# Patient Record
Sex: FEMALE | Race: WHITE
[De-identification: names, ages, dates, MRNs, and addresses within clinical notes are randomized per-mention and may not be internally consistent; named-entity substitution may affect disease eponyms.]

---

## 2017-05-09 ENCOUNTER — APPOINTMENT (OUTPATIENT)
Dept: COLORECTAL SURGERY | Facility: CLINIC | Age: 72
End: 2017-05-09

## 2017-05-09 VITALS
SYSTOLIC BLOOD PRESSURE: 134 MMHG | BODY MASS INDEX: 23.03 KG/M2 | TEMPERATURE: 98.1 F | HEIGHT: 61 IN | DIASTOLIC BLOOD PRESSURE: 74 MMHG | WEIGHT: 122 LBS

## 2017-05-09 DIAGNOSIS — J44.9 CHRONIC OBSTRUCTIVE PULMONARY DISEASE, UNSPECIFIED: ICD-10-CM

## 2017-05-09 DIAGNOSIS — K57.90 DIVERTICULOSIS OF INTESTINE, PART UNSPECIFIED, W/OUT PERFORATION OR ABSCESS W/OUT BLEEDING: ICD-10-CM

## 2017-05-09 DIAGNOSIS — Z78.9 OTHER SPECIFIED HEALTH STATUS: ICD-10-CM

## 2017-05-09 DIAGNOSIS — Z87.891 PERSONAL HISTORY OF NICOTINE DEPENDENCE: ICD-10-CM

## 2017-05-17 RX ORDER — ALVIMOPAN 12 MG/1
12 CAPSULE ORAL ONCE
Qty: 0 | Refills: 0 | Status: COMPLETED | OUTPATIENT
Start: 2017-05-22 | End: 2017-05-22

## 2017-05-17 RX ORDER — SODIUM CHLORIDE 9 MG/ML
3 INJECTION INTRAMUSCULAR; INTRAVENOUS; SUBCUTANEOUS EVERY 8 HOURS
Qty: 0 | Refills: 0 | Status: DISCONTINUED | OUTPATIENT
Start: 2017-05-22 | End: 2017-05-26

## 2017-05-19 ENCOUNTER — OUTPATIENT (OUTPATIENT)
Dept: OUTPATIENT SERVICES | Facility: HOSPITAL | Age: 72
LOS: 1 days | Discharge: HOME | End: 2017-05-19
Payer: COMMERCIAL

## 2017-05-19 VITALS
SYSTOLIC BLOOD PRESSURE: 147 MMHG | DIASTOLIC BLOOD PRESSURE: 62 MMHG | OXYGEN SATURATION: 100 % | TEMPERATURE: 98 F | HEIGHT: 61 IN | HEART RATE: 70 BPM | RESPIRATION RATE: 14 BRPM | WEIGHT: 123.9 LBS

## 2017-05-19 DIAGNOSIS — Z98.890 OTHER SPECIFIED POSTPROCEDURAL STATES: Chronic | ICD-10-CM

## 2017-05-19 DIAGNOSIS — D49.0 NEOPLASM OF UNSPECIFIED BEHAVIOR OF DIGESTIVE SYSTEM: ICD-10-CM

## 2017-05-19 DIAGNOSIS — K56.69 OTHER INTESTINAL OBSTRUCTION: ICD-10-CM

## 2017-05-19 DIAGNOSIS — K63.9 DISEASE OF INTESTINE, UNSPECIFIED: ICD-10-CM

## 2017-05-19 DIAGNOSIS — Z01.818 ENCOUNTER FOR OTHER PREPROCEDURAL EXAMINATION: ICD-10-CM

## 2017-05-19 DIAGNOSIS — Z90.89 ACQUIRED ABSENCE OF OTHER ORGANS: Chronic | ICD-10-CM

## 2017-05-19 DIAGNOSIS — I10 ESSENTIAL (PRIMARY) HYPERTENSION: ICD-10-CM

## 2017-05-19 DIAGNOSIS — J44.1 CHRONIC OBSTRUCTIVE PULMONARY DISEASE WITH (ACUTE) EXACERBATION: ICD-10-CM

## 2017-05-19 DIAGNOSIS — E87.1 HYPO-OSMOLALITY AND HYPONATREMIA: ICD-10-CM

## 2017-05-19 DIAGNOSIS — K21.9 GASTRO-ESOPHAGEAL REFLUX DISEASE WITHOUT ESOPHAGITIS: ICD-10-CM

## 2017-05-19 DIAGNOSIS — K57.32 DIVERTICULITIS OF LARGE INTESTINE WITHOUT PERFORATION OR ABSCESS WITHOUT BLEEDING: ICD-10-CM

## 2017-05-19 DIAGNOSIS — E87.8 OTHER DISORDERS OF ELECTROLYTE AND FLUID BALANCE, NOT ELSEWHERE CLASSIFIED: ICD-10-CM

## 2017-05-19 LAB
ABO RH CONFIRMATION: SIGNIFICANT CHANGE UP
ALBUMIN SERPL ELPH-MCNC: 3.8 G/DL — SIGNIFICANT CHANGE UP (ref 3.3–5)
ALP SERPL-CCNC: 59 U/L — SIGNIFICANT CHANGE UP (ref 40–120)
ALT FLD-CCNC: 23 U/L — SIGNIFICANT CHANGE UP (ref 12–78)
ANION GAP SERPL CALC-SCNC: 9 MMOL/L — SIGNIFICANT CHANGE UP (ref 5–17)
APTT BLD: 23.4 SEC — LOW (ref 27.5–37.4)
AST SERPL-CCNC: 19 U/L — SIGNIFICANT CHANGE UP (ref 15–37)
BASOPHILS # BLD AUTO: 0.1 K/UL — SIGNIFICANT CHANGE UP (ref 0–0.2)
BASOPHILS NFR BLD AUTO: 0.6 % — SIGNIFICANT CHANGE UP (ref 0–2)
BILIRUB SERPL-MCNC: 0.5 MG/DL — SIGNIFICANT CHANGE UP (ref 0.2–1.2)
BLD GP AB SCN SERPL QL: SIGNIFICANT CHANGE UP
BUN SERPL-MCNC: 25 MG/DL — HIGH (ref 7–23)
CALCIUM SERPL-MCNC: 9.6 MG/DL — SIGNIFICANT CHANGE UP (ref 8.5–10.1)
CEA SERPL-MCNC: 2.1 NG/ML — SIGNIFICANT CHANGE UP (ref 0–3.8)
CHLORIDE SERPL-SCNC: 100 MMOL/L — SIGNIFICANT CHANGE UP (ref 96–108)
CO2 SERPL-SCNC: 30 MMOL/L — SIGNIFICANT CHANGE UP (ref 22–31)
CREAT SERPL-MCNC: 0.92 MG/DL — SIGNIFICANT CHANGE UP (ref 0.5–1.3)
EOSINOPHIL # BLD AUTO: 0.1 K/UL — SIGNIFICANT CHANGE UP (ref 0–0.5)
EOSINOPHIL NFR BLD AUTO: 0.9 % — SIGNIFICANT CHANGE UP (ref 0–6)
GLUCOSE SERPL-MCNC: 93 MG/DL — SIGNIFICANT CHANGE UP (ref 70–99)
HBA1C BLD-MCNC: 5.9 % — HIGH (ref 4–5.6)
HCT VFR BLD CALC: 38.1 % — SIGNIFICANT CHANGE UP (ref 34.5–45)
HGB BLD-MCNC: 13.2 G/DL — SIGNIFICANT CHANGE UP (ref 11.5–15.5)
INR BLD: 0.95 RATIO — SIGNIFICANT CHANGE UP (ref 0.88–1.16)
LYMPHOCYTES # BLD AUTO: 2.3 K/UL — SIGNIFICANT CHANGE UP (ref 1–3.3)
LYMPHOCYTES # BLD AUTO: 22.7 % — SIGNIFICANT CHANGE UP (ref 13–44)
MCHC RBC-ENTMCNC: 32.1 PG — SIGNIFICANT CHANGE UP (ref 27–34)
MCHC RBC-ENTMCNC: 34.6 GM/DL — SIGNIFICANT CHANGE UP (ref 32–36)
MCV RBC AUTO: 92.8 FL — SIGNIFICANT CHANGE UP (ref 80–100)
MONOCYTES # BLD AUTO: 0.7 K/UL — SIGNIFICANT CHANGE UP (ref 0–0.9)
MONOCYTES NFR BLD AUTO: 7.1 % — SIGNIFICANT CHANGE UP (ref 2–14)
NEUTROPHILS # BLD AUTO: 6.9 K/UL — SIGNIFICANT CHANGE UP (ref 1.8–7.4)
NEUTROPHILS NFR BLD AUTO: 68.7 % — SIGNIFICANT CHANGE UP (ref 43–77)
PLATELET # BLD AUTO: 276 K/UL — SIGNIFICANT CHANGE UP (ref 150–400)
POTASSIUM SERPL-MCNC: 4.5 MMOL/L — SIGNIFICANT CHANGE UP (ref 3.5–5.3)
POTASSIUM SERPL-SCNC: 4.5 MMOL/L — SIGNIFICANT CHANGE UP (ref 3.5–5.3)
PROT SERPL-MCNC: 6.6 GM/DL — SIGNIFICANT CHANGE UP (ref 6–8.3)
PROTHROM AB SERPL-ACNC: 10.3 SEC — SIGNIFICANT CHANGE UP (ref 9.8–12.7)
RBC # BLD: 4.1 M/UL — SIGNIFICANT CHANGE UP (ref 3.8–5.2)
RBC # FLD: 11 % — SIGNIFICANT CHANGE UP (ref 10.3–14.5)
SODIUM SERPL-SCNC: 139 MMOL/L — SIGNIFICANT CHANGE UP (ref 135–145)
TYPE + AB SCN PNL BLD: SIGNIFICANT CHANGE UP
WBC # BLD: 10.1 K/UL — SIGNIFICANT CHANGE UP (ref 3.8–10.5)
WBC # FLD AUTO: 10.1 K/UL — SIGNIFICANT CHANGE UP (ref 3.8–10.5)

## 2017-05-19 PROCEDURE — 93010 ELECTROCARDIOGRAM REPORT: CPT

## 2017-05-19 RX ORDER — HYDROMORPHONE HYDROCHLORIDE 2 MG/ML
30 INJECTION INTRAMUSCULAR; INTRAVENOUS; SUBCUTANEOUS
Qty: 0 | Refills: 0 | Status: DISCONTINUED | OUTPATIENT
Start: 2017-05-22 | End: 2017-05-24

## 2017-05-19 RX ORDER — HYDROMORPHONE HYDROCHLORIDE 2 MG/ML
0.5 INJECTION INTRAMUSCULAR; INTRAVENOUS; SUBCUTANEOUS
Qty: 0 | Refills: 0 | Status: DISCONTINUED | OUTPATIENT
Start: 2017-05-22 | End: 2017-05-24

## 2017-05-19 RX ORDER — SODIUM CHLORIDE 9 MG/ML
3 INJECTION INTRAMUSCULAR; INTRAVENOUS; SUBCUTANEOUS EVERY 8 HOURS
Qty: 0 | Refills: 0 | Status: DISCONTINUED | OUTPATIENT
Start: 2017-05-22 | End: 2017-05-26

## 2017-05-19 RX ORDER — NALOXONE HYDROCHLORIDE 4 MG/.1ML
0.1 SPRAY NASAL
Qty: 0 | Refills: 0 | Status: DISCONTINUED | OUTPATIENT
Start: 2017-05-22 | End: 2017-05-26

## 2017-05-19 RX ORDER — SODIUM CHLORIDE 9 MG/ML
1000 INJECTION, SOLUTION INTRAVENOUS
Qty: 0 | Refills: 0 | Status: DISCONTINUED | OUTPATIENT
Start: 2017-05-22 | End: 2017-05-22

## 2017-05-19 RX ORDER — OXYCODONE HYDROCHLORIDE 5 MG/1
10 TABLET ORAL ONCE
Qty: 0 | Refills: 0 | Status: DISCONTINUED | OUTPATIENT
Start: 2017-05-22 | End: 2017-05-22

## 2017-05-19 RX ORDER — ACETAMINOPHEN 500 MG
975 TABLET ORAL ONCE
Qty: 0 | Refills: 0 | Status: COMPLETED | OUTPATIENT
Start: 2017-05-22 | End: 2017-05-22

## 2017-05-19 RX ORDER — ONDANSETRON 8 MG/1
4 TABLET, FILM COATED ORAL EVERY 6 HOURS
Qty: 0 | Refills: 0 | Status: DISCONTINUED | OUTPATIENT
Start: 2017-05-22 | End: 2017-05-26

## 2017-05-19 NOTE — H&P PST ADULT - PMH
COPD (chronic obstructive pulmonary disease)    Deviated septum    Diverticulosis    Excessive gas    First degree hemorrhoids    Former smoker  Quit 2010  Hiatal hernia with GERD    History of DVT (deep vein thrombosis)  RLE: 2015  Hypercholesterolemia    Hypertension    Internal hemorrhoids    Osteopenia

## 2017-05-19 NOTE — H&P PST ADULT - FAMILY HISTORY
Father  Still living? No  Family history of lung cancer, Age at diagnosis: 71-80     Mother  Still living? No  Family history of cerebrovascular accident (CVA) in mother, Age at diagnosis: 71-80

## 2017-05-19 NOTE — H&P PST ADULT - VISION (WITH CORRECTIVE LENSES IF THE PATIENT USUALLY WEARS THEM):
Reading glasses PRN./Partially impaired: cannot see medication labels or newsprint, but can see obstacles in path, and the surrounding layout; can count fingers at arm's length

## 2017-05-19 NOTE — H&P PST ADULT - ASSESSMENT
This is a  71y /o  Female who presents to [facility] prior to proposed procedure with Dr. Montano for laparoscopic possible sigmoid colon resection mobilization of splenic flexure, rigid sigmoid proctosigmoidoscopy, lysis of adhesion.     1. labs: per Dr. Ponce.  2. EKG to be reviewed by Cardiology.  3. CXR on chart from 5/2017.  4. Clearance with Dr. Leo completed per patient.  5. EZ sponges, holistic sheet, pamphlet holistic sheet, and day of procedure instructions provided and reviewed with patient.

## 2017-05-19 NOTE — H&P PST ADULT - PSH
S/P colonoscopy  unsure of all dates; last one 2017  S/P LASIK surgery of both eyes  2002  S/P tonsillectomy and adenoidectomy  Childhood

## 2017-05-20 ENCOUNTER — MED ADMIN CHARGE (OUTPATIENT)
Age: 72
End: 2017-05-20

## 2017-05-22 ENCOUNTER — APPOINTMENT (OUTPATIENT)
Dept: COLORECTAL SURGERY | Facility: HOSPITAL | Age: 72
End: 2017-05-22

## 2017-05-22 ENCOUNTER — RESULT REVIEW (OUTPATIENT)
Age: 72
End: 2017-05-22

## 2017-05-22 ENCOUNTER — INPATIENT (INPATIENT)
Facility: HOSPITAL | Age: 72
LOS: 3 days | Discharge: HOME | End: 2017-05-26
Attending: COLON & RECTAL SURGERY | Admitting: COLON & RECTAL SURGERY
Payer: COMMERCIAL

## 2017-05-22 VITALS
OXYGEN SATURATION: 100 % | SYSTOLIC BLOOD PRESSURE: 141 MMHG | DIASTOLIC BLOOD PRESSURE: 73 MMHG | RESPIRATION RATE: 16 BRPM | HEART RATE: 73 BPM | HEIGHT: 61 IN | TEMPERATURE: 97 F | WEIGHT: 121.92 LBS

## 2017-05-22 DIAGNOSIS — Z90.89 ACQUIRED ABSENCE OF OTHER ORGANS: Chronic | ICD-10-CM

## 2017-05-22 DIAGNOSIS — Z98.890 OTHER SPECIFIED POSTPROCEDURAL STATES: Chronic | ICD-10-CM

## 2017-05-22 PROCEDURE — 88305 TISSUE EXAM BY PATHOLOGIST: CPT | Mod: 26

## 2017-05-22 PROCEDURE — 45300 PROCTOSIGMOIDOSCOPY DX: CPT

## 2017-05-22 PROCEDURE — 58740 ADHESIOLYSIS TUBE OVARY: CPT

## 2017-05-22 PROCEDURE — 38570 LAPAROSCOPY LYMPH NODE BIOP: CPT | Mod: AS

## 2017-05-22 PROCEDURE — 44213 LAP MOBIL SPLENIC FL ADD-ON: CPT

## 2017-05-22 PROCEDURE — 44207 L COLECTOMY/COLOPROCTOSTOMY: CPT | Mod: AS

## 2017-05-22 PROCEDURE — 44207 L COLECTOMY/COLOPROCTOSTOMY: CPT

## 2017-05-22 PROCEDURE — 58740 ADHESIOLYSIS TUBE OVARY: CPT | Mod: AS

## 2017-05-22 PROCEDURE — 88342 IMHCHEM/IMCYTCHM 1ST ANTB: CPT | Mod: 26

## 2017-05-22 PROCEDURE — 38570 LAPAROSCOPY LYMPH NODE BIOP: CPT

## 2017-05-22 PROCEDURE — 88309 TISSUE EXAM BY PATHOLOGIST: CPT | Mod: 26

## 2017-05-22 PROCEDURE — 88341 IMHCHEM/IMCYTCHM EA ADD ANTB: CPT | Mod: 26

## 2017-05-22 PROCEDURE — 44213 LAP MOBIL SPLENIC FL ADD-ON: CPT | Mod: AS

## 2017-05-22 RX ORDER — SODIUM CHLORIDE 9 MG/ML
1000 INJECTION, SOLUTION INTRAVENOUS
Qty: 0 | Refills: 0 | Status: DISCONTINUED | OUTPATIENT
Start: 2017-05-22 | End: 2017-05-23

## 2017-05-22 RX ORDER — CELECOXIB 200 MG/1
200 CAPSULE ORAL ONCE
Qty: 0 | Refills: 0 | Status: DISCONTINUED | OUTPATIENT
Start: 2017-05-22 | End: 2017-05-22

## 2017-05-22 RX ORDER — ONDANSETRON 8 MG/1
4 TABLET, FILM COATED ORAL EVERY 6 HOURS
Qty: 0 | Refills: 0 | Status: DISCONTINUED | OUTPATIENT
Start: 2017-05-22 | End: 2017-05-26

## 2017-05-22 RX ORDER — SIMVASTATIN 20 MG/1
10 TABLET, FILM COATED ORAL AT BEDTIME
Qty: 0 | Refills: 0 | Status: DISCONTINUED | OUTPATIENT
Start: 2017-05-22 | End: 2017-05-26

## 2017-05-22 RX ORDER — MONTELUKAST 4 MG/1
10 TABLET, CHEWABLE ORAL DAILY
Qty: 0 | Refills: 0 | Status: DISCONTINUED | OUTPATIENT
Start: 2017-05-22 | End: 2017-05-26

## 2017-05-22 RX ORDER — SIMVASTATIN 20 MG/1
1 TABLET, FILM COATED ORAL
Qty: 0 | Refills: 0 | COMMUNITY

## 2017-05-22 RX ORDER — ACETAMINOPHEN 500 MG
1000 TABLET ORAL ONCE
Qty: 0 | Refills: 0 | Status: COMPLETED | OUTPATIENT
Start: 2017-05-22 | End: 2017-05-22

## 2017-05-22 RX ORDER — CEFOTETAN DISODIUM 1 G
2 VIAL (EA) INJECTION ONCE
Qty: 0 | Refills: 0 | Status: COMPLETED | OUTPATIENT
Start: 2017-05-23 | End: 2017-05-23

## 2017-05-22 RX ORDER — ASPIRIN/CALCIUM CARB/MAGNESIUM 324 MG
1 TABLET ORAL
Qty: 0 | Refills: 0 | COMMUNITY

## 2017-05-22 RX ORDER — CEFOTETAN DISODIUM 1 G
2 VIAL (EA) INJECTION ONCE
Qty: 0 | Refills: 0 | Status: COMPLETED | OUTPATIENT
Start: 2017-05-22 | End: 2017-05-22

## 2017-05-22 RX ORDER — ALVIMOPAN 12 MG/1
12 CAPSULE ORAL
Qty: 0 | Refills: 0 | Status: DISCONTINUED | OUTPATIENT
Start: 2017-05-22 | End: 2017-05-26

## 2017-05-22 RX ORDER — VALSARTAN 80 MG/1
320 TABLET ORAL DAILY
Qty: 0 | Refills: 0 | Status: DISCONTINUED | OUTPATIENT
Start: 2017-05-22 | End: 2017-05-26

## 2017-05-22 RX ORDER — HEPARIN SODIUM 5000 [USP'U]/ML
5000 INJECTION INTRAVENOUS; SUBCUTANEOUS ONCE
Qty: 0 | Refills: 0 | Status: COMPLETED | OUTPATIENT
Start: 2017-05-22 | End: 2017-05-22

## 2017-05-22 RX ORDER — HEPARIN SODIUM 5000 [USP'U]/ML
5000 INJECTION INTRAVENOUS; SUBCUTANEOUS EVERY 8 HOURS
Qty: 0 | Refills: 0 | Status: DISCONTINUED | OUTPATIENT
Start: 2017-05-23 | End: 2017-05-26

## 2017-05-22 RX ORDER — MONTELUKAST 4 MG/1
1 TABLET, CHEWABLE ORAL
Qty: 0 | Refills: 0 | COMMUNITY

## 2017-05-22 RX ORDER — KETOROLAC TROMETHAMINE 30 MG/ML
15 SYRINGE (ML) INJECTION EVERY 6 HOURS
Qty: 0 | Refills: 0 | Status: DISCONTINUED | OUTPATIENT
Start: 2017-05-22 | End: 2017-05-24

## 2017-05-22 RX ADMIN — OXYCODONE HYDROCHLORIDE 10 MILLIGRAM(S): 5 TABLET ORAL at 11:01

## 2017-05-22 RX ADMIN — SODIUM CHLORIDE 150 MILLILITER(S): 9 INJECTION, SOLUTION INTRAVENOUS at 17:10

## 2017-05-22 RX ADMIN — ALVIMOPAN 12 MILLIGRAM(S): 12 CAPSULE ORAL at 11:00

## 2017-05-22 RX ADMIN — Medication 1000 MILLIGRAM(S): at 23:22

## 2017-05-22 RX ADMIN — HYDROMORPHONE HYDROCHLORIDE 30 MILLILITER(S): 2 INJECTION INTRAMUSCULAR; INTRAVENOUS; SUBCUTANEOUS at 16:18

## 2017-05-22 RX ADMIN — HEPARIN SODIUM 5000 UNIT(S): 5000 INJECTION INTRAVENOUS; SUBCUTANEOUS at 11:01

## 2017-05-22 RX ADMIN — SODIUM CHLORIDE 75 MILLILITER(S): 9 INJECTION, SOLUTION INTRAVENOUS at 16:10

## 2017-05-22 RX ADMIN — Medication 975 MILLIGRAM(S): at 11:01

## 2017-05-22 RX ADMIN — Medication 400 MILLIGRAM(S): at 23:10

## 2017-05-22 NOTE — PATIENT PROFILE ADULT. - VISION (WITH CORRECTIVE LENSES IF THE PATIENT USUALLY WEARS THEM):
Partially impaired: cannot see medication labels or newsprint, but can see obstacles in path, and the surrounding layout; can count fingers at arm's length/Reading glasses PRN.

## 2017-05-22 NOTE — BRIEF OPERATIVE NOTE - PROCEDURE
Rigid proctosigmoidoscopy  05/22/2017    Active  MBERRONESJR  Mobilization of splenic flexure performed in conjunction with partial colectomy  05/22/2017    Active  MBERRONESJXAVIER  Colon resection  05/22/2017  laparascopic assisted  Active  MBERRONESJR  Robotic assistance in laparoscopic procedure  05/22/2017    Active  MBERRONESJR

## 2017-05-22 NOTE — BRIEF OPERATIVE NOTE - POST-OP DX
Colon stricture  05/22/2017  sigmoid  Active  Jose Baldwin  Colon tumor  05/22/2017  sigmoid  Active  Jose Baldwin

## 2017-05-23 DIAGNOSIS — K63.9 DISEASE OF INTESTINE, UNSPECIFIED: ICD-10-CM

## 2017-05-23 DIAGNOSIS — I10 ESSENTIAL (PRIMARY) HYPERTENSION: ICD-10-CM

## 2017-05-23 DIAGNOSIS — J44.9 CHRONIC OBSTRUCTIVE PULMONARY DISEASE, UNSPECIFIED: ICD-10-CM

## 2017-05-23 DIAGNOSIS — E78.5 HYPERLIPIDEMIA, UNSPECIFIED: ICD-10-CM

## 2017-05-23 DIAGNOSIS — Z86.718 PERSONAL HISTORY OF OTHER VENOUS THROMBOSIS AND EMBOLISM: ICD-10-CM

## 2017-05-23 LAB
ANION GAP SERPL CALC-SCNC: 10 MMOL/L — SIGNIFICANT CHANGE UP (ref 5–17)
BASOPHILS # BLD AUTO: 0 K/UL — SIGNIFICANT CHANGE UP (ref 0–0.2)
BASOPHILS NFR BLD AUTO: 0.3 % — SIGNIFICANT CHANGE UP (ref 0–2)
BUN SERPL-MCNC: 15 MG/DL — SIGNIFICANT CHANGE UP (ref 7–23)
CALCIUM SERPL-MCNC: 8.3 MG/DL — LOW (ref 8.5–10.1)
CHLORIDE SERPL-SCNC: 94 MMOL/L — LOW (ref 96–108)
CO2 SERPL-SCNC: 26 MMOL/L — SIGNIFICANT CHANGE UP (ref 22–31)
CREAT SERPL-MCNC: 1.19 MG/DL — SIGNIFICANT CHANGE UP (ref 0.5–1.3)
EOSINOPHIL # BLD AUTO: 0 K/UL — SIGNIFICANT CHANGE UP (ref 0–0.5)
EOSINOPHIL NFR BLD AUTO: 0 % — SIGNIFICANT CHANGE UP (ref 0–6)
GLUCOSE SERPL-MCNC: 114 MG/DL — HIGH (ref 70–99)
HCT VFR BLD CALC: 34 % — LOW (ref 34.5–45)
HGB BLD-MCNC: 11.4 G/DL — LOW (ref 11.5–15.5)
LYMPHOCYTES # BLD AUTO: 0.5 K/UL — LOW (ref 1–3.3)
LYMPHOCYTES # BLD AUTO: 3.9 % — LOW (ref 13–44)
MAGNESIUM SERPL-MCNC: 1.8 MG/DL — SIGNIFICANT CHANGE UP (ref 1.6–2.6)
MCHC RBC-ENTMCNC: 32.1 PG — SIGNIFICANT CHANGE UP (ref 27–34)
MCHC RBC-ENTMCNC: 33.6 GM/DL — SIGNIFICANT CHANGE UP (ref 32–36)
MCV RBC AUTO: 95.5 FL — SIGNIFICANT CHANGE UP (ref 80–100)
MONOCYTES # BLD AUTO: 0.8 K/UL — SIGNIFICANT CHANGE UP (ref 0–0.9)
MONOCYTES NFR BLD AUTO: 5.7 % — SIGNIFICANT CHANGE UP (ref 2–14)
NEUTROPHILS # BLD AUTO: 11.9 K/UL — HIGH (ref 1.8–7.4)
NEUTROPHILS NFR BLD AUTO: 90 % — HIGH (ref 43–77)
PHOSPHATE SERPL-MCNC: 3.2 MG/DL — SIGNIFICANT CHANGE UP (ref 2.5–4.5)
PLATELET # BLD AUTO: 262 K/UL — SIGNIFICANT CHANGE UP (ref 150–400)
POTASSIUM SERPL-MCNC: 4.9 MMOL/L — SIGNIFICANT CHANGE UP (ref 3.5–5.3)
POTASSIUM SERPL-SCNC: 4.9 MMOL/L — SIGNIFICANT CHANGE UP (ref 3.5–5.3)
RBC # BLD: 3.56 M/UL — LOW (ref 3.8–5.2)
RBC # FLD: 10.9 % — SIGNIFICANT CHANGE UP (ref 10.3–14.5)
SODIUM SERPL-SCNC: 130 MMOL/L — LOW (ref 135–145)
WBC # BLD: 13.2 K/UL — HIGH (ref 3.8–10.5)
WBC # FLD AUTO: 13.2 K/UL — HIGH (ref 3.8–10.5)

## 2017-05-23 PROCEDURE — 71010: CPT | Mod: 26

## 2017-05-23 RX ORDER — CEFUROXIME AXETIL 250 MG
250 TABLET ORAL EVERY 12 HOURS
Qty: 0 | Refills: 0 | Status: DISCONTINUED | OUTPATIENT
Start: 2017-05-23 | End: 2017-05-26

## 2017-05-23 RX ORDER — ALBUTEROL 90 UG/1
2.5 AEROSOL, METERED ORAL EVERY 4 HOURS
Qty: 0 | Refills: 0 | Status: DISCONTINUED | OUTPATIENT
Start: 2017-05-23 | End: 2017-05-26

## 2017-05-23 RX ORDER — BUDESONIDE AND FORMOTEROL FUMARATE DIHYDRATE 160; 4.5 UG/1; UG/1
1 AEROSOL RESPIRATORY (INHALATION)
Qty: 0 | Refills: 0 | Status: DISCONTINUED | OUTPATIENT
Start: 2017-05-23 | End: 2017-05-26

## 2017-05-23 RX ORDER — SODIUM CHLORIDE 9 MG/ML
1000 INJECTION INTRAMUSCULAR; INTRAVENOUS; SUBCUTANEOUS
Qty: 0 | Refills: 0 | Status: DISCONTINUED | OUTPATIENT
Start: 2017-05-23 | End: 2017-05-23

## 2017-05-23 RX ORDER — SODIUM CHLORIDE 9 MG/ML
1000 INJECTION INTRAMUSCULAR; INTRAVENOUS; SUBCUTANEOUS
Qty: 0 | Refills: 0 | Status: DISCONTINUED | OUTPATIENT
Start: 2017-05-23 | End: 2017-05-24

## 2017-05-23 RX ORDER — IPRATROPIUM/ALBUTEROL SULFATE 18-103MCG
3 AEROSOL WITH ADAPTER (GRAM) INHALATION EVERY 6 HOURS
Qty: 0 | Refills: 0 | Status: DISCONTINUED | OUTPATIENT
Start: 2017-05-23 | End: 2017-05-25

## 2017-05-23 RX ADMIN — SODIUM CHLORIDE 3 MILLILITER(S): 9 INJECTION INTRAMUSCULAR; INTRAVENOUS; SUBCUTANEOUS at 14:03

## 2017-05-23 RX ADMIN — Medication 3 MILLILITER(S): at 22:35

## 2017-05-23 RX ADMIN — SODIUM CHLORIDE 50 MILLILITER(S): 9 INJECTION INTRAMUSCULAR; INTRAVENOUS; SUBCUTANEOUS at 21:59

## 2017-05-23 RX ADMIN — Medication 100 GRAM(S): at 03:47

## 2017-05-23 RX ADMIN — SODIUM CHLORIDE 3 MILLILITER(S): 9 INJECTION INTRAMUSCULAR; INTRAVENOUS; SUBCUTANEOUS at 21:45

## 2017-05-23 RX ADMIN — ALVIMOPAN 12 MILLIGRAM(S): 12 CAPSULE ORAL at 18:56

## 2017-05-23 RX ADMIN — SODIUM CHLORIDE 100 MILLILITER(S): 9 INJECTION INTRAMUSCULAR; INTRAVENOUS; SUBCUTANEOUS at 10:23

## 2017-05-23 RX ADMIN — SIMVASTATIN 10 MILLIGRAM(S): 20 TABLET, FILM COATED ORAL at 21:39

## 2017-05-23 RX ADMIN — HEPARIN SODIUM 5000 UNIT(S): 5000 INJECTION INTRAVENOUS; SUBCUTANEOUS at 05:44

## 2017-05-23 RX ADMIN — HEPARIN SODIUM 5000 UNIT(S): 5000 INJECTION INTRAVENOUS; SUBCUTANEOUS at 14:23

## 2017-05-23 RX ADMIN — BUDESONIDE AND FORMOTEROL FUMARATE DIHYDRATE 1 PUFF(S): 160; 4.5 AEROSOL RESPIRATORY (INHALATION) at 22:33

## 2017-05-23 RX ADMIN — SODIUM CHLORIDE 150 MILLILITER(S): 9 INJECTION, SOLUTION INTRAVENOUS at 03:42

## 2017-05-23 RX ADMIN — ONDANSETRON 4 MILLIGRAM(S): 8 TABLET, FILM COATED ORAL at 20:31

## 2017-05-23 RX ADMIN — Medication 15 MILLIGRAM(S): at 21:38

## 2017-05-23 RX ADMIN — MONTELUKAST 10 MILLIGRAM(S): 4 TABLET, CHEWABLE ORAL at 11:42

## 2017-05-23 RX ADMIN — Medication 250 MILLIGRAM(S): at 18:56

## 2017-05-23 RX ADMIN — ALVIMOPAN 12 MILLIGRAM(S): 12 CAPSULE ORAL at 05:44

## 2017-05-23 RX ADMIN — HEPARIN SODIUM 5000 UNIT(S): 5000 INJECTION INTRAVENOUS; SUBCUTANEOUS at 21:38

## 2017-05-23 RX ADMIN — BUDESONIDE AND FORMOTEROL FUMARATE DIHYDRATE 1 PUFF(S): 160; 4.5 AEROSOL RESPIRATORY (INHALATION) at 15:40

## 2017-05-23 RX ADMIN — VALSARTAN 320 MILLIGRAM(S): 80 TABLET ORAL at 05:44

## 2017-05-23 NOTE — PROGRESS NOTE ADULT - SUBJECTIVE AND OBJECTIVE BOX
Stable overnight. No fevers or chill. No nausea. Ambulating and pain well controlled.    Exam:  Vital Signs Last 24 Hrs  T(C): 36.9, Max: 37.2 (05-22 @ 15:55)  T(F): 98.4, Max: 99 (05-22 @ 15:55)  HR: 83 (66 - 100)  BP: 132/58 (115/57 - 147/67)  RR: 18 (10 - 21)  SpO2: 94% (94% - 100%)    General: alert, in no distress  Respiratory: non labored breathing  Abdomen: soft, non tender, non distended, incision covered with prevena  Neuro: alert and oriented x 3                          11.4   13.2  )-----------( 262      ( 23 May 2017 06:53 )             34.0   05-23    130<L>  |  94<L>  |  15  ----------------------------<  114<H>  4.9   |  26  |  1.19    Ca    8.3<L>      23 May 2017 06:53  Phos  3.2     05-23  Mg     1.8     05-23

## 2017-05-23 NOTE — CONSULT NOTE ADULT - PROBLEM SELECTOR RECOMMENDATION 2
with Asthmatic Bronchitis   now with cough, not on her home med   C/w Montelukast  On Breo will start Symbicort while in the Hospital   NebTx PRN  Monitor if fever or still leukocytosis will need CXR  incentive spirometry

## 2017-05-23 NOTE — PHYSICAL THERAPY INITIAL EVALUATION ADULT - MODALITIES TREATMENT COMMENTS
pt left seated in chair post Eval; chair alarm donned; IV, flowtrons, gresham, wound Vac in place; callbell in reach; pt instructed not to get up alone; call nursing for assist; martita well; pain level 5/10; RN Brian made aware pt OOB

## 2017-05-23 NOTE — CONSULT NOTE ADULT - PROBLEM SELECTOR RECOMMENDATION 9
S/p  Laparoscopic  Partial sigmoid colon resection,  Mobilization of splenic flexure. POD #1.  management as per Colorectal SX  advance diet as per SX  C/w IVF  Monitor electrolytes  and H/H  Ambulate  DVT PPx  GI PPxs   Incentive spirometry

## 2017-05-23 NOTE — CONSULT NOTE ADULT - SUBJECTIVE AND OBJECTIVE BOX
HPI: 71 y.o female with PMH of RLE provoked DVT few years ago,  HTN, HLD, COPD and Asthmatic Bronchitis admitted for elective colon resection 2/2 sigmoid mass. Hospitalist services called for medical consult. Pt is S/p  Laparoscopic  Partial sigmoid colon resection,  Mobilization of splenic flexure. POD #1. Pt reports that was feeling increased flatulence and R sided abdominal "swelling" past month and PCP referred for CT abd which showed possible L sided mass. Pt had first unsuccessful colonoscopy followed by Virtual CT and 2nd Colonoscopy which confirmed sigmoid colon stricture and mass. Today Pt c/o cough, but no wheezing or SOB, no chills or fevers. States that started on Breo by her Pulm recently and had course of Abxs. Also Had CXR at that time which was neg for PNA. Cough improved on meds, but started again after procedure.  Otherwise she is still NPO, with mild nausea. Pain controlled on PCA. Still with Gray. NO Flatus. OOB ambulated       PAST MEDICAL & SURGICAL HISTORY:  Osteopenia  Excessive gas  Hiatal hernia with GERD  Hypertension  Hypercholesterolemia  Deviated septum  History of DVT (deep vein thrombosis): RLE: 2015  COPD (chronic obstructive pulmonary disease)  Former smoker: Quit 2010  First degree hemorrhoids  Diverticulosis  Internal hemorrhoids  S/P LASIK surgery of both eyes: 2002  S/P tonsillectomy and adenoidectomy: Childhood  S/P colonoscopy:  2017      MEDICATIONS  (STANDING):  sodium chloride 0.9% lock flush 3milliLiter(s) IV Push every 8 hours  sodium chloride 0.9% lock flush 3milliLiter(s) IV Push every 8 hours  HYDROmorphone PCA (1 mG/mL) 30milliLiter(s) PCA Continuous PCA Continuous  heparin  Injectable 5000Unit(s) SubCutaneous every 8 hours  alvimopan 12milliGRAM(s) Oral two times a day  ketorolac   Injectable 15milliGRAM(s) IV Push every 6 hours  simvastatin 10milliGRAM(s) Oral at bedtime  montelukast 10milliGRAM(s) Oral daily  valsartan 320milliGRAM(s) Oral daily  sodium chloride 0.9%. 1000milliLiter(s) IV Continuous <Continuous>  buDESOnide 160 MICROgram(s)/formoterol 4.5 MICROgram(s) Inhaler 1Puff(s) Inhalation two times a day    MEDICATIONS  (PRN):  HYDROmorphone PCA (1 mG/mL) Rescue Clinician Bolus 0.5milliGRAM(s) IV Push every 15 minutes PRN for Pain Scale GREATER THAN 6  naloxone Injectable 0.1milliGRAM(s) IV Push every 3 minutes PRN For ANY of the following changes in patient status:  A. RR LESS THAN 10 breaths per minute, B. Oxygen saturation LESS THAN 90%, C. Sedation score of 6  ondansetron Injectable 4milliGRAM(s) IV Push every 6 hours PRN Nausea  ondansetron Injectable 4milliGRAM(s) IV Push every 6 hours PRN Nausea and/or Vomiting      Allergies  bacitracin (Rash)  sulfa drugs (rash)      SOCIAL HISTORY: Former  30+ years smoker, quit about 6 year sago. Drinks alcohol occasionally     FAMILY HISTORY:  Family history of cerebrovascular accident (CVA) in mother (Mother)  Family history of lung cancer (Father): Father      Vital Signs Last 24 Hrs  T(C): 36.9, Max: 37.2 (05-22 @ 15:55)  T(F): 98.4, Max: 99 (05-22 @ 15:55)  HR: 83 (66 - 100)  BP: 132/58 (115/57 - 147/67)  BP(mean): --  RR: 18 (10 - 21)  SpO2: 94% (94% - 100%)    LABS:                        11.4   13.2  )-----------( 262      ( 23 May 2017 06:53 )             34.0     05-23    130<L>  |  94<L>  |  15  ----------------------------<  114<H>  4.9   |  26  |  1.19    Ca    8.3<L>      23 May 2017 06:53  Phos  3.2     05-23  Mg     1.8     05-23      RADIOLOGY & ADDITIONAL STUDIES:  HPI:      PAST MEDICAL & SURGICAL HISTORY:  Osteopenia  Excessive gas  Hiatal hernia with GERD  Hypertension  Hypercholesterolemia  Deviated septum  History of DVT (deep vein thrombosis): RLE: 2015  COPD (chronic obstructive pulmonary disease)  Former smoker: Quit 2010  First degree hemorrhoids  Diverticulosis  Internal hemorrhoids  S/P LASIK surgery of both eyes: 2002  S/P tonsillectomy and adenoidectomy: Childhood  S/P colonoscopy: unsure of all dates; last one 2017      MEDICATIONS  (STANDING):  sodium chloride 0.9% lock flush 3milliLiter(s) IV Push every 8 hours  sodium chloride 0.9% lock flush 3milliLiter(s) IV Push every 8 hours  HYDROmorphone PCA (1 mG/mL) 30milliLiter(s) PCA Continuous PCA Continuous  heparin  Injectable 5000Unit(s) SubCutaneous every 8 hours  alvimopan 12milliGRAM(s) Oral two times a day  ketorolac   Injectable 15milliGRAM(s) IV Push every 6 hours  simvastatin 10milliGRAM(s) Oral at bedtime  montelukast 10milliGRAM(s) Oral daily  valsartan 320milliGRAM(s) Oral daily  sodium chloride 0.9%. 1000milliLiter(s) IV Continuous <Continuous>  buDESOnide 160 MICROgram(s)/formoterol 4.5 MICROgram(s) Inhaler 1Puff(s) Inhalation two times a day    MEDICATIONS  (PRN):  HYDROmorphone PCA (1 mG/mL) Rescue Clinician Bolus 0.5milliGRAM(s) IV Push every 15 minutes PRN for Pain Scale GREATER THAN 6  naloxone Injectable 0.1milliGRAM(s) IV Push every 3 minutes PRN For ANY of the following changes in patient status:  A. RR LESS THAN 10 breaths per minute, B. Oxygen saturation LESS THAN 90%, C. Sedation score of 6  ondansetron Injectable 4milliGRAM(s) IV Push every 6 hours PRN Nausea  ondansetron Injectable 4milliGRAM(s) IV Push every 6 hours PRN Nausea and/or Vomiting      Allergies    bacitracin (Rash)  sulfa drugs (Unknown)    Intolerances        SOCIAL HISTORY:    FAMILY HISTORY:  Family history of cerebrovascular accident (CVA) in mother (Mother)  Family history of lung cancer (Father): Father      Vital Signs Last 24 Hrs  T(C): 36.9, Max: 37.2 (05-22 @ 15:55)  T(F): 98.4, Max: 99 (05-22 @ 15:55)  HR: 83 (66 - 100)  BP: 132/58 (115/57 - 147/67)  BP(mean): --  RR: 18 (10 - 21)  SpO2: 94% (94% - 100%)    LABS:                        11.4   13.2  )-----------( 262      ( 23 May 2017 06:53 )             34.0     05-23    130<L>  |  94<L>  |  15  ----------------------------<  114<H>  4.9   |  26  |  1.19    Ca    8.3<L>      23 May 2017 06:53  Phos  3.2     05-23  Mg     1.8     05-23

## 2017-05-23 NOTE — PROGRESS NOTE ADULT - ASSESSMENT
POD 1 s/p robotic sigmoid resection for colon stricture. Doing well    Start liquid diet  Remove gresham  Change fluids to NS and hyponatremia and hypochloremia noted  Ambulate and IS   VTE prophylaxis

## 2017-05-24 LAB
ANION GAP SERPL CALC-SCNC: 9 MMOL/L — SIGNIFICANT CHANGE UP (ref 5–17)
BASOPHILS # BLD AUTO: 0 K/UL — SIGNIFICANT CHANGE UP (ref 0–0.2)
BASOPHILS NFR BLD AUTO: 0.5 % — SIGNIFICANT CHANGE UP (ref 0–2)
BUN SERPL-MCNC: 13 MG/DL — SIGNIFICANT CHANGE UP (ref 7–23)
CALCIUM SERPL-MCNC: 7.9 MG/DL — LOW (ref 8.5–10.1)
CHLORIDE SERPL-SCNC: 97 MMOL/L — SIGNIFICANT CHANGE UP (ref 96–108)
CO2 SERPL-SCNC: 25 MMOL/L — SIGNIFICANT CHANGE UP (ref 22–31)
CREAT SERPL-MCNC: 0.75 MG/DL — SIGNIFICANT CHANGE UP (ref 0.5–1.3)
EOSINOPHIL # BLD AUTO: 0.1 K/UL — SIGNIFICANT CHANGE UP (ref 0–0.5)
EOSINOPHIL NFR BLD AUTO: 1 % — SIGNIFICANT CHANGE UP (ref 0–6)
GLUCOSE SERPL-MCNC: 93 MG/DL — SIGNIFICANT CHANGE UP (ref 70–99)
HCT VFR BLD CALC: 29.6 % — LOW (ref 34.5–45)
HGB BLD-MCNC: 9.8 G/DL — LOW (ref 11.5–15.5)
LYMPHOCYTES # BLD AUTO: 0.6 K/UL — LOW (ref 1–3.3)
LYMPHOCYTES # BLD AUTO: 9 % — LOW (ref 13–44)
MAGNESIUM SERPL-MCNC: 1.8 MG/DL — SIGNIFICANT CHANGE UP (ref 1.6–2.6)
MCHC RBC-ENTMCNC: 31.6 PG — SIGNIFICANT CHANGE UP (ref 27–34)
MCHC RBC-ENTMCNC: 33.1 GM/DL — SIGNIFICANT CHANGE UP (ref 32–36)
MCV RBC AUTO: 95.4 FL — SIGNIFICANT CHANGE UP (ref 80–100)
MONOCYTES # BLD AUTO: 0.6 K/UL — SIGNIFICANT CHANGE UP (ref 0–0.9)
MONOCYTES NFR BLD AUTO: 9.1 % — SIGNIFICANT CHANGE UP (ref 2–14)
NEUTROPHILS # BLD AUTO: 5.8 K/UL — SIGNIFICANT CHANGE UP (ref 1.8–7.4)
NEUTROPHILS NFR BLD AUTO: 80.4 % — HIGH (ref 43–77)
PHOSPHATE SERPL-MCNC: 2.2 MG/DL — LOW (ref 2.5–4.5)
PLATELET # BLD AUTO: 204 K/UL — SIGNIFICANT CHANGE UP (ref 150–400)
POTASSIUM SERPL-MCNC: 4.4 MMOL/L — SIGNIFICANT CHANGE UP (ref 3.5–5.3)
POTASSIUM SERPL-SCNC: 4.4 MMOL/L — SIGNIFICANT CHANGE UP (ref 3.5–5.3)
RBC # BLD: 3.1 M/UL — LOW (ref 3.8–5.2)
RBC # FLD: 11.2 % — SIGNIFICANT CHANGE UP (ref 10.3–14.5)
SODIUM SERPL-SCNC: 131 MMOL/L — LOW (ref 135–145)
WBC # BLD: 7.2 K/UL — SIGNIFICANT CHANGE UP (ref 3.8–10.5)
WBC # FLD AUTO: 7.2 K/UL — SIGNIFICANT CHANGE UP (ref 3.8–10.5)

## 2017-05-24 RX ORDER — MORPHINE SULFATE 50 MG/1
4 CAPSULE, EXTENDED RELEASE ORAL EVERY 4 HOURS
Qty: 0 | Refills: 0 | Status: DISCONTINUED | OUTPATIENT
Start: 2017-05-24 | End: 2017-05-26

## 2017-05-24 RX ADMIN — ALVIMOPAN 12 MILLIGRAM(S): 12 CAPSULE ORAL at 05:14

## 2017-05-24 RX ADMIN — MORPHINE SULFATE 4 MILLIGRAM(S): 50 CAPSULE, EXTENDED RELEASE ORAL at 18:36

## 2017-05-24 RX ADMIN — HEPARIN SODIUM 5000 UNIT(S): 5000 INJECTION INTRAVENOUS; SUBCUTANEOUS at 05:15

## 2017-05-24 RX ADMIN — Medication 250 MILLIGRAM(S): at 05:14

## 2017-05-24 RX ADMIN — BUDESONIDE AND FORMOTEROL FUMARATE DIHYDRATE 1 PUFF(S): 160; 4.5 AEROSOL RESPIRATORY (INHALATION) at 09:19

## 2017-05-24 RX ADMIN — HEPARIN SODIUM 5000 UNIT(S): 5000 INJECTION INTRAVENOUS; SUBCUTANEOUS at 21:08

## 2017-05-24 RX ADMIN — Medication 63.75 MILLIMOLE(S): at 09:43

## 2017-05-24 RX ADMIN — Medication 3 MILLILITER(S): at 14:16

## 2017-05-24 RX ADMIN — BUDESONIDE AND FORMOTEROL FUMARATE DIHYDRATE 1 PUFF(S): 160; 4.5 AEROSOL RESPIRATORY (INHALATION) at 19:55

## 2017-05-24 RX ADMIN — VALSARTAN 320 MILLIGRAM(S): 80 TABLET ORAL at 05:15

## 2017-05-24 RX ADMIN — HEPARIN SODIUM 5000 UNIT(S): 5000 INJECTION INTRAVENOUS; SUBCUTANEOUS at 13:20

## 2017-05-24 RX ADMIN — ALVIMOPAN 12 MILLIGRAM(S): 12 CAPSULE ORAL at 18:33

## 2017-05-24 RX ADMIN — Medication 3 MILLILITER(S): at 19:55

## 2017-05-24 RX ADMIN — SODIUM CHLORIDE 3 MILLILITER(S): 9 INJECTION INTRAMUSCULAR; INTRAVENOUS; SUBCUTANEOUS at 21:09

## 2017-05-24 RX ADMIN — Medication 15 MILLIGRAM(S): at 05:03

## 2017-05-24 RX ADMIN — SODIUM CHLORIDE 3 MILLILITER(S): 9 INJECTION INTRAMUSCULAR; INTRAVENOUS; SUBCUTANEOUS at 05:20

## 2017-05-24 RX ADMIN — MORPHINE SULFATE 4 MILLIGRAM(S): 50 CAPSULE, EXTENDED RELEASE ORAL at 16:29

## 2017-05-24 RX ADMIN — Medication 3 MILLILITER(S): at 09:17

## 2017-05-24 RX ADMIN — MONTELUKAST 10 MILLIGRAM(S): 4 TABLET, CHEWABLE ORAL at 13:20

## 2017-05-24 RX ADMIN — SIMVASTATIN 10 MILLIGRAM(S): 20 TABLET, FILM COATED ORAL at 21:09

## 2017-05-24 RX ADMIN — Medication 250 MILLIGRAM(S): at 18:33

## 2017-05-24 NOTE — PROGRESS NOTE ADULT - ASSESSMENT
POD 2 s/p robotic sigmoid resection for colon stricture.     Stay on full liquids  On antibiotics per Medical service for pulmonary  Stop IVFs  Replace phos  Ambulate and IS   VTE prophylaxis

## 2017-05-24 NOTE — PROGRESS NOTE ADULT - SUBJECTIVE AND OBJECTIVE BOX
9.8    7.2   )-----------( 204      ( 24 May 2017 07:00 )             29.6   Complains of productive cough which is worsening abdominal pain. Chest xray with atelaxis.     Exam:  Vital Signs Last 24 Hrs  T(C): 37.2, Max: 37.4 (05-23 @ 17:12)  T(F): 99, Max: 99.4 (05-23 @ 17:12)  HR: 94 (78 - 102)  BP: 127/66 (124/48 - 147/72)  RR: 18 (18 - 18)  SpO2: 99% (92% - 99%)    General: alert, in no distress  Respiratory: non labored breathing  Abdomen: soft, non tender, non distended, incision covered with prevena  Neuro: alert and oriented x 3    05-24    131<L>  |  97  |  13  ----------------------------<  93  4.4   |  25  |  0.75    Ca    7.9<L>      24 May 2017 07:00  Phos  2.2     05-24  Mg     1.8     05-24                9.8    7.2   )-----------( 204      ( 24 May 2017 07:00 )             29.6       INTERPRETATION:    INDICATION: 71-year-old wheezing.    Single frontal view of chest dated 5/23/2017 4:30 PM.  No previous   studies are available for comparison.    FINDINGS /   IMPRESSION:     There is no acute consolidation.  Subsegmental atelectasis lung bases.   There are probable small bibasilar pleural effusion. There is borderline   cardiomegaly.    There are degenerative changes.

## 2017-05-24 NOTE — PROGRESS NOTE ADULT - SUBJECTIVE AND OBJECTIVE BOX
HPI: 71 y.o female with PMH of RLE provoked DVT few years ago,  HTN, HLD, COPD and Asthmatic Bronchitis admitted for elective colon resection 2/2 sigmoid mass. Hospitalist services called for medical consult. Pt is S/p  Laparoscopic  Partial sigmoid colon resection,  Mobilization of splenic flexure. POD #1. Pt reports that was feeling increased flatulence and R sided abdominal "swelling" past month and PCP referred for CT abd which showed possible L sided mass. Pt had first unsuccessful colonoscopy followed by Virtual CT and 2nd Colonoscopy which confirmed sigmoid colon stricture and mass. Today Pt c/o cough, but no wheezing or SOB, no chills or fevers. States that started on Breo by her Pulm recently and had course of Abxs. Also Had CXR at that time which was neg for PNA. Cough improved on meds, but started again after procedure.  Otherwise she is still NPO, with mild nausea. Pain controlled on PCA. Still with Gray. NO Flatus. OOB ambulated     5/24: Pt was seen and examined, reports feeling better today. Pt had CXR yesterday and was started on Oral Abxs. Reports cough is better. Started on diet, but poor appetite. Had  BM dark and tarry, explained could be after  procedure.       PAST MEDICAL & SURGICAL HISTORY:  Osteopenia  Excessive gas  Hiatal hernia with GERD  Hypertension  Hypercholesterolemia  Deviated septum  History of DVT (deep vein thrombosis): RLE: 2015  COPD (chronic obstructive pulmonary disease)  Former smoker: Quit 2010  First degree hemorrhoids  Diverticulosis  Internal hemorrhoids  S/P LASIK surgery of both eyes: 2002  S/P tonsillectomy and adenoidectomy: Childhood  S/P colonoscopy:  2017      MEDICATIONS  (STANDING):  sodium chloride 0.9% lock flush 3milliLiter(s) IV Push every 8 hours  sodium chloride 0.9% lock flush 3milliLiter(s) IV Push every 8 hours  HYDROmorphone PCA (1 mG/mL) 30milliLiter(s) PCA Continuous PCA Continuous  heparin  Injectable 5000Unit(s) SubCutaneous every 8 hours  alvimopan 12milliGRAM(s) Oral two times a day  ketorolac   Injectable 15milliGRAM(s) IV Push every 6 hours  simvastatin 10milliGRAM(s) Oral at bedtime  montelukast 10milliGRAM(s) Oral daily  valsartan 320milliGRAM(s) Oral daily  sodium chloride 0.9%. 1000milliLiter(s) IV Continuous <Continuous>  buDESOnide 160 MICROgram(s)/formoterol 4.5 MICROgram(s) Inhaler 1Puff(s) Inhalation two times a day    MEDICATIONS  (PRN):  HYDROmorphone PCA (1 mG/mL) Rescue Clinician Bolus 0.5milliGRAM(s) IV Push every 15 minutes PRN for Pain Scale GREATER THAN 6  naloxone Injectable 0.1milliGRAM(s) IV Push every 3 minutes PRN For ANY of the following changes in patient status:  A. RR LESS THAN 10 breaths per minute, B. Oxygen saturation LESS THAN 90%, C. Sedation score of 6  ondansetron Injectable 4milliGRAM(s) IV Push every 6 hours PRN Nausea  ondansetron Injectable 4milliGRAM(s) IV Push every 6 hours PRN Nausea and/or Vomiting      Allergies  bacitracin (Rash)  sulfa drugs (rash)      SOCIAL HISTORY: Former  30+ years smoker, quit about 6 year sago. Drinks alcohol occasionally     FAMILY HISTORY:  Family history of cerebrovascular accident (CVA) in mother (Mother)  Family history of lung cancer (Father): Father      Vital Signs Last 24 Hrs  T(C): 36.9, Max: 37.2 (05-22 @ 15:55)  T(F): 98.4, Max: 99 (05-22 @ 15:55)  HR: 83 (66 - 100)  BP: 132/58 (115/57 - 147/67)  BP(mean): --  RR: 18 (10 - 21)  SpO2: 94% (94% - 100%)    LABS:                        11.4   13.2  )-----------( 262      ( 23 May 2017 06:53 )             34.0     05-23    130<L>  |  94<L>  |  15  ----------------------------<  114<H>  4.9   |  26  |  1.19    Ca    8.3<L>      23 May 2017 06:53  Phos  3.2     05-23  Mg     1.8     05-23      RADIOLOGY & ADDITIONAL STUDIES:  HPI:      PAST MEDICAL & SURGICAL HISTORY:  Osteopenia  Excessive gas  Hiatal hernia with GERD  Hypertension  Hypercholesterolemia  Deviated septum  History of DVT (deep vein thrombosis): RLE: 2015  COPD (chronic obstructive pulmonary disease)  Former smoker: Quit 2010  First degree hemorrhoids  Diverticulosis  Internal hemorrhoids  S/P LASIK surgery of both eyes: 2002  S/P tonsillectomy and adenoidectomy: Childhood  S/P colonoscopy: unsure of all dates; last one 2017      MEDICATIONS  (STANDING):  sodium chloride 0.9% lock flush 3milliLiter(s) IV Push every 8 hours  sodium chloride 0.9% lock flush 3milliLiter(s) IV Push every 8 hours  HYDROmorphone PCA (1 mG/mL) 30milliLiter(s) PCA Continuous PCA Continuous  heparin  Injectable 5000Unit(s) SubCutaneous every 8 hours  alvimopan 12milliGRAM(s) Oral two times a day  ketorolac   Injectable 15milliGRAM(s) IV Push every 6 hours  simvastatin 10milliGRAM(s) Oral at bedtime  montelukast 10milliGRAM(s) Oral daily  valsartan 320milliGRAM(s) Oral daily  sodium chloride 0.9%. 1000milliLiter(s) IV Continuous <Continuous>  buDESOnide 160 MICROgram(s)/formoterol 4.5 MICROgram(s) Inhaler 1Puff(s) Inhalation two times a day    MEDICATIONS  (PRN):  HYDROmorphone PCA (1 mG/mL) Rescue Clinician Bolus 0.5milliGRAM(s) IV Push every 15 minutes PRN for Pain Scale GREATER THAN 6  naloxone Injectable 0.1milliGRAM(s) IV Push every 3 minutes PRN For ANY of the following changes in patient status:  A. RR LESS THAN 10 breaths per minute, B. Oxygen saturation LESS THAN 90%, C. Sedation score of 6  ondansetron Injectable 4milliGRAM(s) IV Push every 6 hours PRN Nausea  ondansetron Injectable 4milliGRAM(s) IV Push every 6 hours PRN Nausea and/or Vomiting      Allergies    bacitracin (Rash)  sulfa drugs (Unknown)    Intolerances        SOCIAL HISTORY:    FAMILY HISTORY:  Family history of cerebrovascular accident (CVA) in mother (Mother)  Family history of lung cancer (Father): Father      Vital Signs Last 24 Hrs  T(C): 36.9, Max: 37.2 (05-22 @ 15:55)  T(F): 98.4, Max: 99 (05-22 @ 15:55)  HR: 83 (66 - 100)  BP: 132/58 (115/57 - 147/67)  BP(mean): --  RR: 18 (10 - 21)  SpO2: 94% (94% - 100%)    LABS:                        11.4   13.2  )-----------( 262      ( 23 May 2017 06:53 )             34.0     05-23    130<L>  |  94<L>  |  15  ----------------------------<  114<H>  4.9   |  26  |  1.19    Ca    8.3<L>      23 May 2017 06:53  Phos  3.2     05-23  Mg     1.8     05-23

## 2017-05-24 NOTE — PROGRESS NOTE ADULT - ASSESSMENT
71 y.o female with PMH of RLE provoked DVT few years ago,  HTN, HLD, COPD and Asthmatic Bronchitis admitted for elective colon resection 2/2 sigmoid mass. Hospitalist services called for medical consult.     Problem/Recommendation - 1:  Problem: Colonic mass. Recommendation: S/p  Laparoscopic  Partial sigmoid colon resection,  Mobilization of splenic flexure. POD #1.  management as per Colorectal SX  advance diet as per SX  C/w IVF  Monitor electrolytes, getting Potassium replacement today   H/H stable   Ambulate  DVT PPx  GI PPxs   Incentive spirometry.    Problem/Recommendation - 2:  ·  Problem: Chronic obstructive pulmonary disease (COPD).  Recommendation: with Asthmatic Bronchitis   now with exacerbation   C/w Montelukast  started on Symbicort  Duonebs RTC  Albuterol  PRN  Started on ceftin 250mg Po BID    leukocytosis resolved   CXR no PNA, but atelectasis   incentive spirometry.     Problem/Recommendation - 3:  ·  Problem: Essential hypertension, benign.  Recommendation: Monitor BP  On Diovan-HCTZ at home  C/w Valsartan, Hold HCTZ for now.     Problem/Recommendation - 4:  ·  Problem: Hyperlipidemia, unspecified hyperlipidemia type.  Recommendation: c/w simvastatin.     Problem/Recommendation - 5:  ·  Problem: History of DVT (deep vein thrombosis).  Recommendation: provoked by long plane flight   not on A/c now   High risk for DVT  C/w Venodynes and heparin SQ.

## 2017-05-25 LAB
ANION GAP SERPL CALC-SCNC: 9 MMOL/L — SIGNIFICANT CHANGE UP (ref 5–17)
BASOPHILS # BLD AUTO: 0.1 K/UL — SIGNIFICANT CHANGE UP (ref 0–0.2)
BASOPHILS NFR BLD AUTO: 1.3 % — SIGNIFICANT CHANGE UP (ref 0–2)
BUN SERPL-MCNC: 8 MG/DL — SIGNIFICANT CHANGE UP (ref 7–23)
CALCIUM SERPL-MCNC: 8.3 MG/DL — LOW (ref 8.5–10.1)
CHLORIDE SERPL-SCNC: 100 MMOL/L — SIGNIFICANT CHANGE UP (ref 96–108)
CO2 SERPL-SCNC: 25 MMOL/L — SIGNIFICANT CHANGE UP (ref 22–31)
CREAT SERPL-MCNC: 0.66 MG/DL — SIGNIFICANT CHANGE UP (ref 0.5–1.3)
EOSINOPHIL # BLD AUTO: 0.2 K/UL — SIGNIFICANT CHANGE UP (ref 0–0.5)
EOSINOPHIL NFR BLD AUTO: 3.7 % — SIGNIFICANT CHANGE UP (ref 0–6)
GLUCOSE SERPL-MCNC: 100 MG/DL — HIGH (ref 70–99)
HCT VFR BLD CALC: 28 % — LOW (ref 34.5–45)
HGB BLD-MCNC: 10 G/DL — LOW (ref 11.5–15.5)
LYMPHOCYTES # BLD AUTO: 0.8 K/UL — LOW (ref 1–3.3)
LYMPHOCYTES # BLD AUTO: 15.7 % — SIGNIFICANT CHANGE UP (ref 13–44)
MAGNESIUM SERPL-MCNC: 1.9 MG/DL — SIGNIFICANT CHANGE UP (ref 1.6–2.6)
MCHC RBC-ENTMCNC: 33 PG — SIGNIFICANT CHANGE UP (ref 27–34)
MCHC RBC-ENTMCNC: 35.7 GM/DL — SIGNIFICANT CHANGE UP (ref 32–36)
MCV RBC AUTO: 92.4 FL — SIGNIFICANT CHANGE UP (ref 80–100)
MONOCYTES # BLD AUTO: 0.5 K/UL — SIGNIFICANT CHANGE UP (ref 0–0.9)
MONOCYTES NFR BLD AUTO: 10.6 % — SIGNIFICANT CHANGE UP (ref 2–14)
NEUTROPHILS # BLD AUTO: 3.3 K/UL — SIGNIFICANT CHANGE UP (ref 1.8–7.4)
NEUTROPHILS NFR BLD AUTO: 68.7 % — SIGNIFICANT CHANGE UP (ref 43–77)
PHOSPHATE SERPL-MCNC: 2.6 MG/DL — SIGNIFICANT CHANGE UP (ref 2.5–4.5)
PLATELET # BLD AUTO: 188 K/UL — SIGNIFICANT CHANGE UP (ref 150–400)
POTASSIUM SERPL-MCNC: 3.6 MMOL/L — SIGNIFICANT CHANGE UP (ref 3.5–5.3)
POTASSIUM SERPL-SCNC: 3.6 MMOL/L — SIGNIFICANT CHANGE UP (ref 3.5–5.3)
RBC # BLD: 3.03 M/UL — LOW (ref 3.8–5.2)
RBC # FLD: 11 % — SIGNIFICANT CHANGE UP (ref 10.3–14.5)
SODIUM SERPL-SCNC: 134 MMOL/L — LOW (ref 135–145)
WBC # BLD: 4.8 K/UL — SIGNIFICANT CHANGE UP (ref 3.8–10.5)
WBC # FLD AUTO: 4.8 K/UL — SIGNIFICANT CHANGE UP (ref 3.8–10.5)

## 2017-05-25 PROCEDURE — 71020: CPT | Mod: 26

## 2017-05-25 RX ORDER — PANTOPRAZOLE SODIUM 20 MG/1
40 TABLET, DELAYED RELEASE ORAL ONCE
Qty: 0 | Refills: 0 | Status: COMPLETED | OUTPATIENT
Start: 2017-05-25 | End: 2017-05-25

## 2017-05-25 RX ORDER — ALBUTEROL 90 UG/1
2.5 AEROSOL, METERED ORAL ONCE
Qty: 0 | Refills: 0 | Status: DISCONTINUED | OUTPATIENT
Start: 2017-05-25 | End: 2017-05-26

## 2017-05-25 RX ORDER — TIOTROPIUM BROMIDE 18 UG/1
1 CAPSULE ORAL; RESPIRATORY (INHALATION) DAILY
Qty: 0 | Refills: 0 | Status: DISCONTINUED | OUTPATIENT
Start: 2017-05-25 | End: 2017-05-26

## 2017-05-25 RX ORDER — ALBUTEROL 90 UG/1
3 AEROSOL, METERED ORAL ONCE
Qty: 0 | Refills: 0 | Status: COMPLETED | OUTPATIENT
Start: 2017-05-25 | End: 2017-05-25

## 2017-05-25 RX ORDER — SODIUM CHLORIDE 0.65 %
2 AEROSOL, SPRAY (ML) NASAL
Qty: 0 | Refills: 0 | Status: DISCONTINUED | OUTPATIENT
Start: 2017-05-25 | End: 2017-05-26

## 2017-05-25 RX ORDER — ALBUTEROL 90 UG/1
1 AEROSOL, METERED ORAL EVERY 4 HOURS
Qty: 0 | Refills: 0 | Status: DISCONTINUED | OUTPATIENT
Start: 2017-05-25 | End: 2017-05-26

## 2017-05-25 RX ORDER — IPRATROPIUM/ALBUTEROL SULFATE 18-103MCG
3 AEROSOL WITH ADAPTER (GRAM) INHALATION EVERY 4 HOURS
Qty: 0 | Refills: 0 | Status: DISCONTINUED | OUTPATIENT
Start: 2017-05-25 | End: 2017-05-26

## 2017-05-25 RX ORDER — PANTOPRAZOLE SODIUM 20 MG/1
40 TABLET, DELAYED RELEASE ORAL
Qty: 0 | Refills: 0 | Status: DISCONTINUED | OUTPATIENT
Start: 2017-05-25 | End: 2017-05-26

## 2017-05-25 RX ADMIN — SODIUM CHLORIDE 3 MILLILITER(S): 9 INJECTION INTRAMUSCULAR; INTRAVENOUS; SUBCUTANEOUS at 22:32

## 2017-05-25 RX ADMIN — Medication 3 MILLILITER(S): at 06:40

## 2017-05-25 RX ADMIN — Medication 3 MILLILITER(S): at 15:46

## 2017-05-25 RX ADMIN — MORPHINE SULFATE 4 MILLIGRAM(S): 50 CAPSULE, EXTENDED RELEASE ORAL at 01:13

## 2017-05-25 RX ADMIN — Medication 3 MILLILITER(S): at 02:09

## 2017-05-25 RX ADMIN — ALVIMOPAN 12 MILLIGRAM(S): 12 CAPSULE ORAL at 18:34

## 2017-05-25 RX ADMIN — Medication 40 MILLIGRAM(S): at 10:34

## 2017-05-25 RX ADMIN — Medication 3 MILLILITER(S): at 20:39

## 2017-05-25 RX ADMIN — PANTOPRAZOLE SODIUM 40 MILLIGRAM(S): 20 TABLET, DELAYED RELEASE ORAL at 22:25

## 2017-05-25 RX ADMIN — SODIUM CHLORIDE 3 MILLILITER(S): 9 INJECTION INTRAMUSCULAR; INTRAVENOUS; SUBCUTANEOUS at 13:34

## 2017-05-25 RX ADMIN — Medication 3 MILLILITER(S): at 14:09

## 2017-05-25 RX ADMIN — Medication 40 MILLIGRAM(S): at 22:26

## 2017-05-25 RX ADMIN — VALSARTAN 320 MILLIGRAM(S): 80 TABLET ORAL at 05:57

## 2017-05-25 RX ADMIN — SIMVASTATIN 10 MILLIGRAM(S): 20 TABLET, FILM COATED ORAL at 22:26

## 2017-05-25 RX ADMIN — HEPARIN SODIUM 5000 UNIT(S): 5000 INJECTION INTRAVENOUS; SUBCUTANEOUS at 14:00

## 2017-05-25 RX ADMIN — MONTELUKAST 10 MILLIGRAM(S): 4 TABLET, CHEWABLE ORAL at 13:21

## 2017-05-25 RX ADMIN — Medication 250 MILLIGRAM(S): at 05:57

## 2017-05-25 RX ADMIN — ALVIMOPAN 12 MILLIGRAM(S): 12 CAPSULE ORAL at 05:57

## 2017-05-25 RX ADMIN — BUDESONIDE AND FORMOTEROL FUMARATE DIHYDRATE 1 PUFF(S): 160; 4.5 AEROSOL RESPIRATORY (INHALATION) at 20:42

## 2017-05-25 RX ADMIN — Medication 250 MILLIGRAM(S): at 18:34

## 2017-05-25 RX ADMIN — ALBUTEROL 2.5 MILLIGRAM(S): 90 AEROSOL, METERED ORAL at 08:02

## 2017-05-25 RX ADMIN — MORPHINE SULFATE 4 MILLIGRAM(S): 50 CAPSULE, EXTENDED RELEASE ORAL at 22:26

## 2017-05-25 RX ADMIN — HEPARIN SODIUM 5000 UNIT(S): 5000 INJECTION INTRAVENOUS; SUBCUTANEOUS at 05:57

## 2017-05-25 RX ADMIN — SODIUM CHLORIDE 3 MILLILITER(S): 9 INJECTION INTRAMUSCULAR; INTRAVENOUS; SUBCUTANEOUS at 05:26

## 2017-05-25 NOTE — CONSULT NOTE ADULT - ASSESSMENT
PROBLEMS:    Colonic mass s/p sigmoid resection  moderate persistant asthma  pleural effusion  lung nodule  multiple allergies  h/o DVT  h/o pneumpnoia  HTN    PLAN:    iv steroids  aerosol  po abx  antirefux rx  singulair  DVT prophylasix
71 y.o female with PMH of RLE provoked DVT few years ago,  HTN, HLD, COPD and Asthmatic Bronchitis admitted for elective colon resection 2/2 sigmoid mass. Hospitalist services called for medical consult.

## 2017-05-25 NOTE — PROGRESS NOTE ADULT - ASSESSMENT
71 y.o female with PMH of RLE provoked DVT few years ago,  HTN, HLD, COPD and Asthmatic Bronchitis admitted for elective colon resection 2/2 sigmoid mass. Hospitalist services called for medical consult.     Problem/Recommendation - 1:  Problem: Colonic mass. Recommendation: S/p  Laparoscopic  Partial sigmoid colon resection,  Mobilization of splenic flexure. POD #1.  management as per Colorectal SX  advance diet as per SX  C/w IVF  Monitor electrolytes, getting Potassium replacement today   H/H stable   Ambulate  DVT PPx  GI PPxs   Incentive spirometry.    Problem/Recommendation - 2:  ·  Problem: Chronic obstructive pulmonary disease (COPD).  Recommendation: with Asthmatic Bronchitis   now with exacerbation   C/w Montelukast  started on Symbicort  Duonebs RTC  Albuterol  PRN  Started on ceftin 250mg Po BID    leukocytosis resolved   CXR no PNA, but atelectasis   incentive spirometry.     Problem/Recommendation - 3:  ·  Problem: Essential hypertension, benign.  Recommendation: Monitor BP  On Diovan-HCTZ at home  C/w Valsartan, Hold HCTZ for now.     Problem/Recommendation - 4:  ·  Problem: Hyperlipidemia, unspecified hyperlipidemia type.  Recommendation: c/w simvastatin.     Problem/Recommendation - 5:  ·  Problem: History of DVT (deep vein thrombosis).  Recommendation: provoked by long plane flight   not on A/c now   High risk for DVT  C/w Venodynes and heparin SQ. 71 y.o female with PMH of RLE provoked DVT few years ago,  HTN, HLD, COPD and Asthmatic Bronchitis admitted for elective colon resection 2/2 sigmoid mass. Hospitalist services called for medical consult.     Problem/Recommendation - 1:  Problem: Colonic mass. Recommendation: S/p  Laparoscopic  Partial sigmoid colon resection,  Mobilization of splenic flexure. POD #3.  management as per Colorectal SX  advance diet as per SX  off IVF  Tolerates  full liquid,  diet advaced   Monitor electrolytes   H/H stable   Ambulate  DVT PPx  GI PPxs   Incentive spirometry.    Problem/Recommendation - 2:  ·  Problem: Chronic obstructive pulmonary disease (COPD).  Recommendation: with Asthmatic Bronchitis   now with exacerbation   C/w Montelukast  c/w  Symbicort  Duonebs RTC  Albuterol  PRN  C/w  ceftin 250mg Po BID   Started on Solumedrol today   leukocytosis resolved  D/w DR Wells, will repeat CXR   incentive spirometry.     Problem/Recommendation - 3:  ·  Problem: Essential hypertension, benign.  Recommendation: Monitor BP  On Diovan-HCTZ at home  C/w Valsartan, HCTZ on hold, will restart       Problem/Recommendation - 4:  ·  Problem: Hyperlipidemia, unspecified hyperlipidemia type.  Recommendation: c/w simvastatin.     Problem/Recommendation - 5:  ·  Problem: History of DVT (deep vein thrombosis).  Recommendation: provoked by long plane flight   not on A/c now   High risk for DVT  C/w Venodynes and heparin SQ.

## 2017-05-25 NOTE — PROGRESS NOTE ADULT - SUBJECTIVE AND OBJECTIVE BOX
Still complains of coughing and wheezing making it difficult to sleep. No fevers or chills. Having black stools. No nausea or emesis    Exam:  Vital Signs Last 24 Hrs  T(C): 36.9, Max: 36.9 (05-25 @ 05:52)  T(F): 98.4, Max: 98.4 (05-25 @ 05:52)  HR: 82 (82 - 102)  BP: 136/64 (133/62 - 143/66)  RR: 17 (17 - 17)  SpO2: 100% (95% - 100%)    General: alert, in no distress  Respiratory: non labored breathing  Abdomen: soft, non tender, non distended, incision covered with prevena  Neuro: alert and oriented x 3                          10.0   4.8   )-----------( 188      ( 25 May 2017 06:44 )             28.0   05-25    134<L>  |  100  |  8   ----------------------------<  100<H>  3.6   |  25  |  0.66    Ca    8.3<L>      25 May 2017 06:44  Phos  2.6     05-25  Mg     1.9     05-25

## 2017-05-25 NOTE — PROGRESS NOTE ADULT - SUBJECTIVE AND OBJECTIVE BOX
HPI: 71 y.o female with PMH of RLE provoked DVT few years ago,  HTN, HLD, COPD and Asthmatic Bronchitis admitted for elective colon resection 2/2 sigmoid mass. Hospitalist services called for medical consult. Pt is S/p  Laparoscopic  Partial sigmoid colon resection,  Mobilization of splenic flexure. POD #1. Pt reports that was feeling increased flatulence and R sided abdominal "swelling" past month and PCP referred for CT abd which showed possible L sided mass. Pt had first unsuccessful colonoscopy followed by Virtual CT and 2nd Colonoscopy which confirmed sigmoid colon stricture and mass. Today Pt c/o cough, but no wheezing or SOB, no chills or fevers. States that started on Breo by her Pulm recently and had course of Abxs. Also Had CXR at that time which was neg for PNA. Cough improved on meds, but started again after procedure.  Otherwise she is still NPO, with mild nausea. Pain controlled on PCA. Still with Gray. NO Flatus. OOB ambulated     5/24: Pt was seen and examined, reports feeling better today. Pt had CXR yesterday and was started on Oral Abxs. Reports cough is better. Started on diet, but poor appetite. Had  BM dark and tarry, explained could be after  procedure.       PAST MEDICAL & SURGICAL HISTORY:  Osteopenia  Excessive gas  Hiatal hernia with GERD  Hypertension  Hypercholesterolemia  Deviated septum  History of DVT (deep vein thrombosis): RLE: 2015  COPD (chronic obstructive pulmonary disease)  Former smoker: Quit 2010  First degree hemorrhoids  Diverticulosis  Internal hemorrhoids  S/P LASIK surgery of both eyes: 2002  S/P tonsillectomy and adenoidectomy: Childhood  S/P colonoscopy:  2017      MEDICATIONS  (STANDING):  sodium chloride 0.9% lock flush 3milliLiter(s) IV Push every 8 hours  sodium chloride 0.9% lock flush 3milliLiter(s) IV Push every 8 hours  HYDROmorphone PCA (1 mG/mL) 30milliLiter(s) PCA Continuous PCA Continuous  heparin  Injectable 5000Unit(s) SubCutaneous every 8 hours  alvimopan 12milliGRAM(s) Oral two times a day  ketorolac   Injectable 15milliGRAM(s) IV Push every 6 hours  simvastatin 10milliGRAM(s) Oral at bedtime  montelukast 10milliGRAM(s) Oral daily  valsartan 320milliGRAM(s) Oral daily  sodium chloride 0.9%. 1000milliLiter(s) IV Continuous <Continuous>  buDESOnide 160 MICROgram(s)/formoterol 4.5 MICROgram(s) Inhaler 1Puff(s) Inhalation two times a day    MEDICATIONS  (PRN):  HYDROmorphone PCA (1 mG/mL) Rescue Clinician Bolus 0.5milliGRAM(s) IV Push every 15 minutes PRN for Pain Scale GREATER THAN 6  naloxone Injectable 0.1milliGRAM(s) IV Push every 3 minutes PRN For ANY of the following changes in patient status:  A. RR LESS THAN 10 breaths per minute, B. Oxygen saturation LESS THAN 90%, C. Sedation score of 6  ondansetron Injectable 4milliGRAM(s) IV Push every 6 hours PRN Nausea  ondansetron Injectable 4milliGRAM(s) IV Push every 6 hours PRN Nausea and/or Vomiting      Allergies  bacitracin (Rash)  sulfa drugs (rash)      SOCIAL HISTORY: Former  30+ years smoker, quit about 6 year sago. Drinks alcohol occasionally     FAMILY HISTORY:  Family history of cerebrovascular accident (CVA) in mother (Mother)  Family history of lung cancer (Father): Father      Vital Signs Last 24 Hrs  T(C): 36.9, Max: 37.2 (05-22 @ 15:55)  T(F): 98.4, Max: 99 (05-22 @ 15:55)  HR: 83 (66 - 100)  BP: 132/58 (115/57 - 147/67)  BP(mean): --  RR: 18 (10 - 21)  SpO2: 94% (94% - 100%)    LABS:                        11.4   13.2  )-----------( 262      ( 23 May 2017 06:53 )             34.0     05-23    130<L>  |  94<L>  |  15  ----------------------------<  114<H>  4.9   |  26  |  1.19    Ca    8.3<L>      23 May 2017 06:53  Phos  3.2     05-23  Mg     1.8     05-23      RADIOLOGY & ADDITIONAL STUDIES:  HPI:      PAST MEDICAL & SURGICAL HISTORY:  Osteopenia  Excessive gas  Hiatal hernia with GERD  Hypertension  Hypercholesterolemia  Deviated septum  History of DVT (deep vein thrombosis): RLE: 2015  COPD (chronic obstructive pulmonary disease)  Former smoker: Quit 2010  First degree hemorrhoids  Diverticulosis  Internal hemorrhoids  S/P LASIK surgery of both eyes: 2002  S/P tonsillectomy and adenoidectomy: Childhood  S/P colonoscopy: unsure of all dates; last one 2017      MEDICATIONS  (STANDING):  sodium chloride 0.9% lock flush 3milliLiter(s) IV Push every 8 hours  sodium chloride 0.9% lock flush 3milliLiter(s) IV Push every 8 hours  HYDROmorphone PCA (1 mG/mL) 30milliLiter(s) PCA Continuous PCA Continuous  heparin  Injectable 5000Unit(s) SubCutaneous every 8 hours  alvimopan 12milliGRAM(s) Oral two times a day  ketorolac   Injectable 15milliGRAM(s) IV Push every 6 hours  simvastatin 10milliGRAM(s) Oral at bedtime  montelukast 10milliGRAM(s) Oral daily  valsartan 320milliGRAM(s) Oral daily  sodium chloride 0.9%. 1000milliLiter(s) IV Continuous <Continuous>  buDESOnide 160 MICROgram(s)/formoterol 4.5 MICROgram(s) Inhaler 1Puff(s) Inhalation two times a day    MEDICATIONS  (PRN):  HYDROmorphone PCA (1 mG/mL) Rescue Clinician Bolus 0.5milliGRAM(s) IV Push every 15 minutes PRN for Pain Scale GREATER THAN 6  naloxone Injectable 0.1milliGRAM(s) IV Push every 3 minutes PRN For ANY of the following changes in patient status:  A. RR LESS THAN 10 breaths per minute, B. Oxygen saturation LESS THAN 90%, C. Sedation score of 6  ondansetron Injectable 4milliGRAM(s) IV Push every 6 hours PRN Nausea  ondansetron Injectable 4milliGRAM(s) IV Push every 6 hours PRN Nausea and/or Vomiting      Allergies    bacitracin (Rash)  sulfa drugs (Unknown)    Intolerances        SOCIAL HISTORY:    FAMILY HISTORY:  Family history of cerebrovascular accident (CVA) in mother (Mother)  Family history of lung cancer (Father): Father      Vital Signs Last 24 Hrs  T(C): 36.9, Max: 37.2 (05-22 @ 15:55)  T(F): 98.4, Max: 99 (05-22 @ 15:55)  HR: 83 (66 - 100)  BP: 132/58 (115/57 - 147/67)  BP(mean): --  RR: 18 (10 - 21)  SpO2: 94% (94% - 100%)    LABS:                        11.4   13.2  )-----------( 262      ( 23 May 2017 06:53 )             34.0     05-23    130<L>  |  94<L>  |  15  ----------------------------<  114<H>  4.9   |  26  |  1.19    Ca    8.3<L>      23 May 2017 06:53  Phos  3.2     05-23  Mg     1.8     05-23 HPI: 71 y.o female with PMH of RLE provoked DVT few years ago,  HTN, HLD, COPD and Asthmatic Bronchitis admitted for elective colon resection 2/2 sigmoid mass. Hospitalist services called for medical consult. Pt is S/p  Laparoscopic  Partial sigmoid colon resection,  Mobilization of splenic flexure. POD #1. Pt reports that was feeling increased flatulence and R sided abdominal "swelling" past month and PCP referred for CT abd which showed possible L sided mass. Pt had first unsuccessful colonoscopy followed by Virtual CT and 2nd Colonoscopy which confirmed sigmoid colon stricture and mass. Today Pt c/o cough, but no wheezing or SOB, no chills or fevers. States that started on Breo by her Pulm recently and had course of Abxs. Also Had CXR at that time which was neg for PNA. Cough improved on meds, but started again after procedure.  Otherwise she is still NPO, with mild nausea. Pain controlled on PCA. Still with Gray. NO Flatus. OOB ambulated     5/24: Pt was seen and examined, reports feeling better today. Pt had CXR yesterday and was started on Oral Abxs. Reports cough is better. Started on diet, but poor appetite. Had  BM dark and tarry, explained could be after  procedure.     5/25: Pt was seen and examined, reports start wheezing yesterday, still cough w/o phlegm,   no fevers, no SOB, noted some LE edema. Diet advanced, feels hungry. + BMs still tarry.        PAST MEDICAL & SURGICAL HISTORY:  Osteopenia  Excessive gas  Hiatal hernia with GERD  Hypertension  Hypercholesterolemia  Deviated septum  History of DVT (deep vein thrombosis): RLE: 2015  COPD (chronic obstructive pulmonary disease)  Former smoker: Quit 2010  First degree hemorrhoids  Diverticulosis  Internal hemorrhoids  S/P LASIK surgery of both eyes: 2002  S/P tonsillectomy and adenoidectomy: Childhood  S/P colonoscopy:  2017          Allergies  bacitracin (Rash)  sulfa drugs (rash)      SOCIAL HISTORY: Former  30+ years smoker, quit about 6 year sago. Drinks alcohol occasionally     FAMILY HISTORY:  Family history of cerebrovascular accident (CVA) in mother (Mother)  Family history of lung cancer (Father): Father      Vital Signs Last 24 Hrs  T(C): 36.9, Max: 37.2 (05-22 @ 15:55)  T(F): 98.4, Max: 99 (05-22 @ 15:55)  HR: 83 (66 - 100)  BP: 132/58 (115/57 - 147/67)  BP(mean): --  RR: 18 (10 - 21)  SpO2: 94% (94% - 100%)    LABS:                        11.4   13.2  )-----------( 262      ( 23 May 2017 06:53 )             34.0     05-23    130<L>  |  94<L>  |  15  ----------------------------<  114<H>  4.9   |  26  |  1.19    Ca    8.3<L>      23 May 2017 06:53  Phos  3.2     05-23  Mg     1.8     05-23      RADIOLOGY & ADDITIONAL STUDIES:  HPI:      PAST MEDICAL & SURGICAL HISTORY:  Osteopenia  Excessive gas  Hiatal hernia with GERD  Hypertension  Hypercholesterolemia  Deviated septum  History of DVT (deep vein thrombosis): RLE: 2015  COPD (chronic obstructive pulmonary disease)  Former smoker: Quit 2010  First degree hemorrhoids  Diverticulosis  Internal hemorrhoids  S/P LASIK surgery of both eyes: 2002  S/P tonsillectomy and adenoidectomy: Childhood  S/P colonoscopy: unsure of all dates; last one 2017      MEDICATIONS  (STANDING):  sodium chloride 0.9% lock flush 3milliLiter(s) IV Push every 8 hours  sodium chloride 0.9% lock flush 3milliLiter(s) IV Push every 8 hours  HYDROmorphone PCA (1 mG/mL) 30milliLiter(s) PCA Continuous PCA Continuous  heparin  Injectable 5000Unit(s) SubCutaneous every 8 hours  alvimopan 12milliGRAM(s) Oral two times a day  ketorolac   Injectable 15milliGRAM(s) IV Push every 6 hours  simvastatin 10milliGRAM(s) Oral at bedtime  montelukast 10milliGRAM(s) Oral daily  valsartan 320milliGRAM(s) Oral daily  sodium chloride 0.9%. 1000milliLiter(s) IV Continuous <Continuous>  buDESOnide 160 MICROgram(s)/formoterol 4.5 MICROgram(s) Inhaler 1Puff(s) Inhalation two times a day    MEDICATIONS  (PRN):  HYDROmorphone PCA (1 mG/mL) Rescue Clinician Bolus 0.5milliGRAM(s) IV Push every 15 minutes PRN for Pain Scale GREATER THAN 6  naloxone Injectable 0.1milliGRAM(s) IV Push every 3 minutes PRN For ANY of the following changes in patient status:  A. RR LESS THAN 10 breaths per minute, B. Oxygen saturation LESS THAN 90%, C. Sedation score of 6  ondansetron Injectable 4milliGRAM(s) IV Push every 6 hours PRN Nausea  ondansetron Injectable 4milliGRAM(s) IV Push every 6 hours PRN Nausea and/or Vomiting      Allergies    bacitracin (Rash)  sulfa drugs (Unknown)    Intolerances        SOCIAL HISTORY:    FAMILY HISTORY:  Family history of cerebrovascular accident (CVA) in mother (Mother)  Family history of lung cancer (Father): Father      Vital Signs Last 24 Hrs  T(C): 36.9, Max: 37.2 (05-22 @ 15:55)  T(F): 98.4, Max: 99 (05-22 @ 15:55)  HR: 83 (66 - 100)  BP: 132/58 (115/57 - 147/67)  BP(mean): --  RR: 18 (10 - 21)  SpO2: 94% (94% - 100%)    LABS:                        11.4   13.2  )-----------( 262      ( 23 May 2017 06:53 )             34.0     05-23    130<L>  |  94<L>  |  15  ----------------------------<  114<H>  4.9   |  26  |  1.19    Ca    8.3<L>      23 May 2017 06:53  Phos  3.2     05-23  Mg     1.8     05-23 HPI: 71 y.o female with PMH of RLE provoked DVT few years ago,  HTN, HLD, COPD and Asthmatic Bronchitis admitted for elective colon resection 2/2 sigmoid mass. Hospitalist services called for medical consult. Pt is S/p  Laparoscopic  Partial sigmoid colon resection,  Mobilization of splenic flexure. POD #1. Pt reports that was feeling increased flatulence and R sided abdominal "swelling" past month and PCP referred for CT abd which showed possible L sided mass. Pt had first unsuccessful colonoscopy followed by Virtual CT and 2nd Colonoscopy which confirmed sigmoid colon stricture and mass. Today Pt c/o cough, but no wheezing or SOB, no chills or fevers. States that started on Breo by her Pulm recently and had course of Abxs. Also Had CXR at that time which was neg for PNA. Cough improved on meds, but started again after procedure.  Otherwise she is still NPO, with mild nausea. Pain controlled on PCA. Still with Gray. NO Flatus. OOB ambulated     5/24: Pt was seen and examined, reports feeling better today. Pt had CXR yesterday and was started on Oral Abxs. Reports cough is better. Started on diet, but poor appetite. Had  BM dark and tarry, explained could be after  procedure.     5/25: Pt was seen and examined, reports start wheezing yesterday, still cough w/o phlegm,   no fevers, no SOB, noted some LE edema. Diet advanced, feels hungry. + BMs still tarry.      Vital Signs Last 24 Hrs  T(C): 36.7, Max: 36.9 (05-25 @ 05:52)  T(F): 98.1, Max: 98.4 (05-25 @ 05:52)  HR: 94 (80 - 107)  BP: 151/68 (136/64 - 161/73)  BP(mean): --  RR: 18 (17 - 19)  SpO2: 97% (96% - 100%)    MEDICATIONS  (STANDING):  sodium chloride 0.9% lock flush 3milliLiter(s) IV Push every 8 hours  sodium chloride 0.9% lock flush 3milliLiter(s) IV Push every 8 hours  heparin  Injectable 5000Unit(s) SubCutaneous every 8 hours  alvimopan 12milliGRAM(s) Oral two times a day  simvastatin 10milliGRAM(s) Oral at bedtime  montelukast 10milliGRAM(s) Oral daily  valsartan 320milliGRAM(s) Oral daily  buDESOnide 160 MICROgram(s)/formoterol 4.5 MICROgram(s) Inhaler 1Puff(s) Inhalation two times a day  cefuroxime   Tablet 250milliGRAM(s) Oral every 12 hours  ALBUTerol    0.083%. 2.5milliGRAM(s) Nebulizer once  methylPREDNISolone sodium succinate Injectable 40milliGRAM(s) IV Push every 8 hours  ALBUTerol/ipratropium for Nebulization 3milliLiter(s) Nebulizer every 4 hours  ALBUTerol    90 MICROgram(s) HFA Inhaler 1Puff(s) Inhalation every 4 hours  tiotropium 18 MICROgram(s) Capsule 1Capsule(s) Inhalation daily  pantoprazole    Tablet 40milliGRAM(s) Oral before breakfast  pantoprazole  Injectable 40milliGRAM(s) IV Push once    MEDICATIONS  (PRN):  naloxone Injectable 0.1milliGRAM(s) IV Push every 3 minutes PRN For ANY of the following changes in patient status:  A. RR LESS THAN 10 breaths per minute, B. Oxygen saturation LESS THAN 90%, C. Sedation score of 6  ondansetron Injectable 4milliGRAM(s) IV Push every 6 hours PRN Nausea  ondansetron Injectable 4milliGRAM(s) IV Push every 6 hours PRN Nausea and/or Vomiting  ALBUTerol    0.083% 2.5milliGRAM(s) Nebulizer every 4 hours PRN Shortness of Breath and/or Wheezing  oxyCODONE  5 mG/acetaminophen 325 mG 1Tablet(s) Oral every 6 hours PRN Mild Pain (1 - 3)  oxyCODONE  5 mG/acetaminophen 325 mG 2Tablet(s) Oral every 6 hours PRN Moderate Pain (4 - 6)  morphine  - Injectable 4milliGRAM(s) IV Push every 4 hours PRN Severe Pain (7 - 10)  sodium chloride 0.65% Nasal 2Spray(s) Both Nostrils every 1 hour PRN Nasal Congestion                            10.0   4.8   )-----------( 188      ( 25 May 2017 06:44 )             28.0     05-25    134<L>  |  100  |  8   ----------------------------<  100<H>  3.6   |  25  |  0.66    Ca    8.3<L>      25 May 2017 06:44  Phos  2.6     05-25  Mg     1.9     05-25    EXAM:  CHEST SINGLE VIEW FRONTAL                            PROCEDURE DATE:  05/23/2017        INTERPRETATION:    INDICATION: 71-year-old wheezing.    Single frontal view of chest dated 5/23/2017 4:30 PM.  No previous   studies are available for comparison.    FINDINGS /   IMPRESSION:     There is no acute consolidation.  Subsegmental atelectasis lung bases.   There are probable small bibasilar pleural effusion. There is borderline   cardiomegaly.    There are degenerative changes.

## 2017-05-25 NOTE — CONSULT NOTE ADULT - SUBJECTIVE AND OBJECTIVE BOX
HPI:      PAST MEDICAL & SURGICAL HISTORY:  Osteopenia  Excessive gas  Hiatal hernia with GERD  Hypertension  Hypercholesterolemia  Deviated septum  History of DVT (deep vein thrombosis): RLE: 2015  COPD (chronic obstructive pulmonary disease)  Former smoker: Quit 2010  First degree hemorrhoids  Diverticulosis  Internal hemorrhoids  S/P LASIK surgery of both eyes: 2002  S/P tonsillectomy and adenoidectomy: Childhood  S/P colonoscopy: unsure of all dates; last one 2017      MEDICATIONS  (STANDING):  sodium chloride 0.9% lock flush 3milliLiter(s) IV Push every 8 hours  sodium chloride 0.9% lock flush 3milliLiter(s) IV Push every 8 hours  heparin  Injectable 5000Unit(s) SubCutaneous every 8 hours  alvimopan 12milliGRAM(s) Oral two times a day  simvastatin 10milliGRAM(s) Oral at bedtime  montelukast 10milliGRAM(s) Oral daily  valsartan 320milliGRAM(s) Oral daily  buDESOnide 160 MICROgram(s)/formoterol 4.5 MICROgram(s) Inhaler 1Puff(s) Inhalation two times a day  cefuroxime   Tablet 250milliGRAM(s) Oral every 12 hours  ALBUTerol    0.083%. 2.5milliGRAM(s) Nebulizer once  methylPREDNISolone sodium succinate Injectable 40milliGRAM(s) IV Push every 8 hours  ALBUTerol/ipratropium for Nebulization 3milliLiter(s) Nebulizer every 4 hours  ALBUTerol    90 MICROgram(s) HFA Inhaler 1Puff(s) Inhalation every 4 hours  tiotropium 18 MICROgram(s) Capsule 1Capsule(s) Inhalation daily    MEDICATIONS  (PRN):  naloxone Injectable 0.1milliGRAM(s) IV Push every 3 minutes PRN For ANY of the following changes in patient status:  A. RR LESS THAN 10 breaths per minute, B. Oxygen saturation LESS THAN 90%, C. Sedation score of 6  ondansetron Injectable 4milliGRAM(s) IV Push every 6 hours PRN Nausea  ondansetron Injectable 4milliGRAM(s) IV Push every 6 hours PRN Nausea and/or Vomiting  ALBUTerol    0.083% 2.5milliGRAM(s) Nebulizer every 4 hours PRN Shortness of Breath and/or Wheezing  oxyCODONE  5 mG/acetaminophen 325 mG 1Tablet(s) Oral every 6 hours PRN Mild Pain (1 - 3)  oxyCODONE  5 mG/acetaminophen 325 mG 2Tablet(s) Oral every 6 hours PRN Moderate Pain (4 - 6)  morphine  - Injectable 4milliGRAM(s) IV Push every 4 hours PRN Severe Pain (7 - 10)      Allergies    bacitracin (Rash)  sulfa drugs (Unknown)    Intolerances        SOCIAL HISTORY: Denies tobacco, etoh abuse or illicit drug use    FAMILY HISTORY:  Family history of cerebrovascular accident (CVA) in mother (Mother)  Family history of lung cancer (Father): Father      Vital Signs Last 24 Hrs  T(C): 36.9, Max: 36.9 (05-25 @ 05:52)  T(F): 98.4, Max: 98.4 (05-25 @ 05:52)  HR: 82 (82 - 102)  BP: 136/64 (133/62 - 143/66)  BP(mean): --  RR: 17 (17 - 17)  SpO2: 100% (95% - 100%)    REVIEW OF SYSTEMS:    CONSTITUTIONAL:  As per HPI.  HEENT:  Eyes:  No diplopia or blurred vision. ENT:  No earache, sore throat or runny nose.  CARDIOVASCULAR:  No pressure, squeezing, tightness, heaviness or aching about the chest, neck, axilla or epigastrium.  RESPIRATORY:  No cough, shortness of breath, PND or orthopnea.  GASTROINTESTINAL:  No nausea, vomiting or diarrhea.  GENITOURINARY:  No dysuria, frequency or urgency.  MUSCULOSKELETAL:  As per HPI.  SKIN:  No change in skin, hair or nails.  NEUROLOGIC:  No paresthesias, fasciculations, seizures or weakness.  PSYCHIATRIC:  No disorder of thought or mood.  ENDOCRINE:  No heat or cold intolerance, polyuria or polydipsia.  HEMATOLOGICAL:  No easy bruising or bleedings:  .     PHYSICAL EXAMINATION:    GENERAL APPEARANCE:  Pt. is not currently dyspneic, in no distress. Pt. is alert, oriented, and pleasant.  HEENT:  Pupils are normal and react normally. No icterus. Mucous membranes well colored.  NECK:  Supple. No lymphadenopathy. Jugular venous pressure not elevated. Carotids equal.   HEART:   The cardiac impulse has a normal quality. Regular. Normal S1 and S2. There are no murmurs, rubs or gallops noted  CHEST:  Chest is clear to auscultation. Normal respiratory effort.  ABDOMEN:  Soft and nontender.   EXTREMITIES:  There is no cyanosis, clubbing or edema.   SKIN:  No rash or significant lesions are noted.    LABS:                        10.0   4.8   )-----------( 188      ( 25 May 2017 06:44 )             28.0     05-25    134<L>  |  100  |  8   ----------------------------<  100<H>  3.6   |  25  |  0.66    Ca    8.3<L>      25 May 2017 06:44  Phos  2.6     05-25  Mg     1.9     05-25                    RADIOLOGY & ADDITIONAL STUDIES: HPI:    pat seen & examine & full consult dictated.    PAST MEDICAL & SURGICAL HISTORY:  Osteopenia  Excessive gas  Hiatal hernia with GERD  Hypertension  Hypercholesterolemia  Deviated septum  History of DVT (deep vein thrombosis): RLE: 2015  COPD (chronic obstructive pulmonary disease)  Former smoker: Quit 2010  First degree hemorrhoids  Diverticulosis  Internal hemorrhoids  S/P LASIK surgery of both eyes: 2002  S/P tonsillectomy and adenoidectomy: Childhood  S/P colonoscopy: unsure of all dates; last one 2017      MEDICATIONS  (STANDING):  sodium chloride 0.9% lock flush 3milliLiter(s) IV Push every 8 hours  sodium chloride 0.9% lock flush 3milliLiter(s) IV Push every 8 hours  heparin  Injectable 5000Unit(s) SubCutaneous every 8 hours  alvimopan 12milliGRAM(s) Oral two times a day  simvastatin 10milliGRAM(s) Oral at bedtime  montelukast 10milliGRAM(s) Oral daily  valsartan 320milliGRAM(s) Oral daily  buDESOnide 160 MICROgram(s)/formoterol 4.5 MICROgram(s) Inhaler 1Puff(s) Inhalation two times a day  cefuroxime   Tablet 250milliGRAM(s) Oral every 12 hours  ALBUTerol    0.083%. 2.5milliGRAM(s) Nebulizer once  methylPREDNISolone sodium succinate Injectable 40milliGRAM(s) IV Push every 8 hours  ALBUTerol/ipratropium for Nebulization 3milliLiter(s) Nebulizer every 4 hours  ALBUTerol    90 MICROgram(s) HFA Inhaler 1Puff(s) Inhalation every 4 hours  tiotropium 18 MICROgram(s) Capsule 1Capsule(s) Inhalation daily    MEDICATIONS  (PRN):  naloxone Injectable 0.1milliGRAM(s) IV Push every 3 minutes PRN For ANY of the following changes in patient status:  A. RR LESS THAN 10 breaths per minute, B. Oxygen saturation LESS THAN 90%, C. Sedation score of 6  ondansetron Injectable 4milliGRAM(s) IV Push every 6 hours PRN Nausea  ondansetron Injectable 4milliGRAM(s) IV Push every 6 hours PRN Nausea and/or Vomiting  ALBUTerol    0.083% 2.5milliGRAM(s) Nebulizer every 4 hours PRN Shortness of Breath and/or Wheezing  oxyCODONE  5 mG/acetaminophen 325 mG 1Tablet(s) Oral every 6 hours PRN Mild Pain (1 - 3)  oxyCODONE  5 mG/acetaminophen 325 mG 2Tablet(s) Oral every 6 hours PRN Moderate Pain (4 - 6)  morphine  - Injectable 4milliGRAM(s) IV Push every 4 hours PRN Severe Pain (7 - 10)      Allergies    bacitracin (Rash)  sulfa drugs (Unknown)    Intolerances        SOCIAL HISTORY: Denies tobacco, etoh abuse or illicit drug use    FAMILY HISTORY:  Family history of cerebrovascular accident (CVA) in mother (Mother)  Family history of lung cancer (Father): Father      Vital Signs Last 24 Hrs  T(C): 36.9, Max: 36.9 (05-25 @ 05:52)  T(F): 98.4, Max: 98.4 (05-25 @ 05:52)  HR: 82 (82 - 102)  BP: 136/64 (133/62 - 143/66)  BP(mean): --  RR: 17 (17 - 17)  SpO2: 100% (95% - 100%)    REVIEW OF SYSTEMS:    CONSTITUTIONAL:  As per HPI.  HEENT:  Eyes:  No diplopia or blurred vision. ENT:  No earache, sore throat or runny nose.  CARDIOVASCULAR:  No pressure, squeezing, tightness, heaviness or aching about the chest, neck, axilla or epigastrium.  RESPIRATORY:  No cough, shortness of breath, PND or orthopnea.  GASTROINTESTINAL:  No nausea, vomiting or diarrhea.  GENITOURINARY:  No dysuria, frequency or urgency.  MUSCULOSKELETAL:  As per HPI.  SKIN:  No change in skin, hair or nails.  NEUROLOGIC:  No paresthesias, fasciculations, seizures or weakness.  PSYCHIATRIC:  No disorder of thought or mood.  ENDOCRINE:  No heat or cold intolerance, polyuria or polydipsia.  HEMATOLOGICAL:  No easy bruising or bleedings:  .     PHYSICAL EXAMINATION:    GENERAL APPEARANCE:  Pt. is not currently dyspneic, in no distress. Pt. is alert, oriented, and pleasant.  HEENT:  Pupils are normal and react normally. No icterus. Mucous membranes well colored.  NECK:  Supple. No lymphadenopathy. Jugular venous pressure not elevated. Carotids equal.   HEART:   The cardiac impulse has a normal quality. Regular. Normal S1 and S2. There are no murmurs, rubs or gallops noted  CHEST:  Chest is clear to auscultation. Normal respiratory effort.  ABDOMEN:  Soft and nontender.   EXTREMITIES:  There is no cyanosis, clubbing or edema.   SKIN:  No rash or significant lesions are noted.    LABS:                        10.0   4.8   )-----------( 188      ( 25 May 2017 06:44 )             28.0     05-25    134<L>  |  100  |  8   ----------------------------<  100<H>  3.6   |  25  |  0.66    Ca    8.3<L>      25 May 2017 06:44  Phos  2.6     05-25  Mg     1.9     05-25                    RADIOLOGY & ADDITIONAL STUDIES:

## 2017-05-25 NOTE — PROGRESS NOTE ADULT - ASSESSMENT
POD 3 s/p robotic sigmoid resection for colon stricture. Stable but still coughing. Having bloody stool but hgb stable.     Low fiber diet  On antibiotics per Medical service for pulmonary  Respiratory treatments  Ambulate and IS   VTE prophylaxis  Anticipate discharge tomorrow

## 2017-05-25 NOTE — PROGRESS NOTE ADULT - GASTROINTESTINAL DETAILS
soft
no distention/mild tenderness at site of procedure, wound vac in place/soft/bowel sounds normal

## 2017-05-26 ENCOUNTER — TRANSCRIPTION ENCOUNTER (OUTPATIENT)
Age: 72
End: 2017-05-26

## 2017-05-26 VITALS
HEART RATE: 102 BPM | SYSTOLIC BLOOD PRESSURE: 141 MMHG | DIASTOLIC BLOOD PRESSURE: 66 MMHG | TEMPERATURE: 99 F | OXYGEN SATURATION: 100 %

## 2017-05-26 LAB
ANION GAP SERPL CALC-SCNC: 11 MMOL/L — SIGNIFICANT CHANGE UP (ref 5–17)
ANISOCYTOSIS BLD QL: SLIGHT — SIGNIFICANT CHANGE UP
BASOPHILS # BLD AUTO: 0 K/UL — SIGNIFICANT CHANGE UP (ref 0–0.2)
BASOPHILS NFR BLD AUTO: 0.6 % — SIGNIFICANT CHANGE UP (ref 0–2)
BUN SERPL-MCNC: 9 MG/DL — SIGNIFICANT CHANGE UP (ref 7–23)
CALCIUM SERPL-MCNC: 9.1 MG/DL — SIGNIFICANT CHANGE UP (ref 8.5–10.1)
CHLORIDE SERPL-SCNC: 98 MMOL/L — SIGNIFICANT CHANGE UP (ref 96–108)
CO2 SERPL-SCNC: 25 MMOL/L — SIGNIFICANT CHANGE UP (ref 22–31)
CREAT SERPL-MCNC: 0.71 MG/DL — SIGNIFICANT CHANGE UP (ref 0.5–1.3)
EOSINOPHIL # BLD AUTO: 0 K/UL — SIGNIFICANT CHANGE UP (ref 0–0.5)
EOSINOPHIL NFR BLD AUTO: 0.1 % — SIGNIFICANT CHANGE UP (ref 0–6)
GLUCOSE SERPL-MCNC: 127 MG/DL — HIGH (ref 70–99)
HCT VFR BLD CALC: 35.5 % — SIGNIFICANT CHANGE UP (ref 34.5–45)
HGB BLD-MCNC: 12.1 G/DL — SIGNIFICANT CHANGE UP (ref 11.5–15.5)
LYMPHOCYTES # BLD AUTO: 0.3 K/UL — LOW (ref 1–3.3)
LYMPHOCYTES # BLD AUTO: 4.6 % — LOW (ref 13–44)
MACROCYTES BLD QL: SLIGHT — SIGNIFICANT CHANGE UP
MAGNESIUM SERPL-MCNC: 2 MG/DL — SIGNIFICANT CHANGE UP (ref 1.6–2.6)
MANUAL DIF COMMENT BLD-IMP: SIGNIFICANT CHANGE UP
MCHC RBC-ENTMCNC: 31.7 PG — SIGNIFICANT CHANGE UP (ref 27–34)
MCHC RBC-ENTMCNC: 34.2 GM/DL — SIGNIFICANT CHANGE UP (ref 32–36)
MCV RBC AUTO: 92.8 FL — SIGNIFICANT CHANGE UP (ref 80–100)
MONOCYTES # BLD AUTO: 0.2 K/UL — SIGNIFICANT CHANGE UP (ref 0–0.9)
MONOCYTES NFR BLD AUTO: 2.7 % — SIGNIFICANT CHANGE UP (ref 2–14)
NEUTROPHILS # BLD AUTO: 5.8 K/UL — SIGNIFICANT CHANGE UP (ref 1.8–7.4)
NEUTROPHILS NFR BLD AUTO: 92 % — HIGH (ref 43–77)
NT-PROBNP SERPL-SCNC: 2271 PG/ML — HIGH (ref 0–125)
OVALOCYTES BLD QL SMEAR: SLIGHT — SIGNIFICANT CHANGE UP
PHOSPHATE SERPL-MCNC: 3.2 MG/DL — SIGNIFICANT CHANGE UP (ref 2.5–4.5)
PLAT MORPH BLD: NORMAL — SIGNIFICANT CHANGE UP
PLATELET # BLD AUTO: 298 K/UL — SIGNIFICANT CHANGE UP (ref 150–400)
POIKILOCYTOSIS BLD QL AUTO: SLIGHT — SIGNIFICANT CHANGE UP
POLYCHROMASIA BLD QL SMEAR: SLIGHT — SIGNIFICANT CHANGE UP
POTASSIUM SERPL-MCNC: 4.1 MMOL/L — SIGNIFICANT CHANGE UP (ref 3.5–5.3)
POTASSIUM SERPL-SCNC: 4.1 MMOL/L — SIGNIFICANT CHANGE UP (ref 3.5–5.3)
RBC # BLD: 3.82 M/UL — SIGNIFICANT CHANGE UP (ref 3.8–5.2)
RBC # FLD: 11 % — SIGNIFICANT CHANGE UP (ref 10.3–14.5)
RBC BLD AUTO: (no result)
SODIUM SERPL-SCNC: 134 MMOL/L — LOW (ref 135–145)
SURGICAL PATHOLOGY FINAL REPORT - CH: SIGNIFICANT CHANGE UP
WBC # BLD: 6.4 K/UL — SIGNIFICANT CHANGE UP (ref 3.8–10.5)
WBC # FLD AUTO: 6.4 K/UL — SIGNIFICANT CHANGE UP (ref 3.8–10.5)

## 2017-05-26 PROCEDURE — 71250 CT THORAX DX C-: CPT | Mod: 26

## 2017-05-26 RX ORDER — ALBUTEROL 90 UG/1
2 AEROSOL, METERED ORAL
Qty: 1 | Refills: 0 | OUTPATIENT
Start: 2017-05-26

## 2017-05-26 RX ORDER — BUDESONIDE AND FORMOTEROL FUMARATE DIHYDRATE 160; 4.5 UG/1; UG/1
2 AEROSOL RESPIRATORY (INHALATION)
Qty: 1 | Refills: 0 | OUTPATIENT
Start: 2017-05-26

## 2017-05-26 RX ORDER — CEFUROXIME AXETIL 250 MG
1 TABLET ORAL
Qty: 10 | Refills: 0 | OUTPATIENT
Start: 2017-05-26 | End: 2017-05-31

## 2017-05-26 RX ORDER — OXYCODONE HYDROCHLORIDE 5 MG/1
1 TABLET ORAL
Qty: 20 | Refills: 0 | OUTPATIENT
Start: 2017-05-26

## 2017-05-26 RX ADMIN — Medication 3 MILLILITER(S): at 04:06

## 2017-05-26 RX ADMIN — Medication 40 MILLIGRAM(S): at 06:15

## 2017-05-26 RX ADMIN — MORPHINE SULFATE 4 MILLIGRAM(S): 50 CAPSULE, EXTENDED RELEASE ORAL at 06:20

## 2017-05-26 RX ADMIN — SODIUM CHLORIDE 3 MILLILITER(S): 9 INJECTION INTRAMUSCULAR; INTRAVENOUS; SUBCUTANEOUS at 07:42

## 2017-05-26 RX ADMIN — PANTOPRAZOLE SODIUM 40 MILLIGRAM(S): 20 TABLET, DELAYED RELEASE ORAL at 06:15

## 2017-05-26 RX ADMIN — Medication 250 MILLIGRAM(S): at 06:15

## 2017-05-26 RX ADMIN — ALVIMOPAN 12 MILLIGRAM(S): 12 CAPSULE ORAL at 06:15

## 2017-05-26 RX ADMIN — Medication 3 MILLILITER(S): at 08:36

## 2017-05-26 RX ADMIN — BUDESONIDE AND FORMOTEROL FUMARATE DIHYDRATE 1 PUFF(S): 160; 4.5 AEROSOL RESPIRATORY (INHALATION) at 08:38

## 2017-05-26 RX ADMIN — Medication 3 MILLILITER(S): at 00:13

## 2017-05-26 RX ADMIN — VALSARTAN 320 MILLIGRAM(S): 80 TABLET ORAL at 06:15

## 2017-05-26 RX ADMIN — HEPARIN SODIUM 5000 UNIT(S): 5000 INJECTION INTRAVENOUS; SUBCUTANEOUS at 01:06

## 2017-05-26 RX ADMIN — HEPARIN SODIUM 5000 UNIT(S): 5000 INJECTION INTRAVENOUS; SUBCUTANEOUS at 06:15

## 2017-05-26 RX ADMIN — MORPHINE SULFATE 4 MILLIGRAM(S): 50 CAPSULE, EXTENDED RELEASE ORAL at 00:06

## 2017-05-26 NOTE — PROGRESS NOTE ADULT - PROVIDER SPECIALTY LIST ADULT
Colorectal Surgery
Hospitalist
Hospitalist
Pulmonology

## 2017-05-26 NOTE — PROGRESS NOTE ADULT - SUBJECTIVE AND OBJECTIVE BOX
POD 4, no n/v, tolerating diet, had BM  wheezing is better    Vital Signs Last 24 Hrs  T(C): 37.1, Max: 37.1 (05-26 @ 06:14)  T(F): 98.7, Max: 98.7 (05-26 @ 06:14)  HR: 102 (80 - 107)  BP: 141/66 (141/58 - 161/73)  BP(mean): --  RR: 18 (18 - 19)  SpO2: 100% (95% - 100%)    NAD  abd soft, incision clean,Prevena VAC removed                          12.1   6.4   )-----------( 298      ( 26 May 2017 07:14 )             35.5   05-26    134<L>  |  98  |  9   ----------------------------<  127<H>  4.1   |  25  |  0.71    Ca    9.1      26 May 2017 07:14  Phos  3.2     05-26  Mg     2.0     05-26

## 2017-05-26 NOTE — DISCHARGE NOTE ADULT - HOSPITAL COURSE
Patient underwent a robotic lap assisted sigmioid colon resection for colon stricture, colon mass. She tolerated procedure well. tolerated diet, had return of bowel function. She did develop a postop cough and asthma/ COPD exacerbation that prompted a pulmonary consult,  and medical eval Dr. Jones. She was placed on oral abx ceftin, steroids and proventil and symbicort. She will be transitioned to oral steroids for several days at home. Overall did well. wheezing and cough improved. stable for dc home

## 2017-05-26 NOTE — PROGRESS NOTE ADULT - ASSESSMENT
PROBLEMS:    Colonic mass s/p sigmoid resection  moderate persistant asthma  pleural effusion  lung nodule  multiple allergies  h/o DVT  h/o pneumpnoia  HTN    PLAN:    change to po prednisone  aerosol  po abx  antirefux rx  singulair  discharge planning  DVT prophylasix

## 2017-05-26 NOTE — DISCHARGE NOTE ADULT - PATIENT PORTAL LINK FT
“You can access the [employer] Patient Portal, offered by [facility], by registering with the following website: [email or web address]”

## 2017-05-26 NOTE — PROGRESS NOTE ADULT - ASSESSMENT
sigmoid colon stricture, colon mass  s/p robotic lap assisted sigmoid colon resection  COPD exacerbation  asthma    cont low res diet  no path back yet  dc home with po steroids ,po abx   d/w Dr. Wells    Pt Seen and examined with Elida Black.

## 2017-05-26 NOTE — DISCHARGE NOTE ADULT - CARE PLAN
Principal Discharge DX:	Colonic mass  Goal:	recovery  Instructions for follow-up, activity and diet:	follow up in 2weeks, no strenuous activities, low fiber diet

## 2017-05-26 NOTE — PROGRESS NOTE ADULT - SUBJECTIVE AND OBJECTIVE BOX
Subjective:    pat better, less wheezing, still prominent cough. CXR reviewed.    MEDICATIONS  (STANDING):  sodium chloride 0.9% lock flush 3milliLiter(s) IV Push every 8 hours  sodium chloride 0.9% lock flush 3milliLiter(s) IV Push every 8 hours  heparin  Injectable 5000Unit(s) SubCutaneous every 8 hours  alvimopan 12milliGRAM(s) Oral two times a day  simvastatin 10milliGRAM(s) Oral at bedtime  montelukast 10milliGRAM(s) Oral daily  valsartan 320milliGRAM(s) Oral daily  buDESOnide 160 MICROgram(s)/formoterol 4.5 MICROgram(s) Inhaler 1Puff(s) Inhalation two times a day  cefuroxime   Tablet 250milliGRAM(s) Oral every 12 hours  ALBUTerol    0.083%. 2.5milliGRAM(s) Nebulizer once  methylPREDNISolone sodium succinate Injectable 40milliGRAM(s) IV Push every 8 hours  ALBUTerol/ipratropium for Nebulization 3milliLiter(s) Nebulizer every 4 hours  ALBUTerol    90 MICROgram(s) HFA Inhaler 1Puff(s) Inhalation every 4 hours  tiotropium 18 MICROgram(s) Capsule 1Capsule(s) Inhalation daily  pantoprazole    Tablet 40milliGRAM(s) Oral before breakfast  hydrochlorothiazide    Capsule 12.5milliGRAM(s) Oral daily    MEDICATIONS  (PRN):  naloxone Injectable 0.1milliGRAM(s) IV Push every 3 minutes PRN For ANY of the following changes in patient status:  A. RR LESS THAN 10 breaths per minute, B. Oxygen saturation LESS THAN 90%, C. Sedation score of 6  ondansetron Injectable 4milliGRAM(s) IV Push every 6 hours PRN Nausea  ondansetron Injectable 4milliGRAM(s) IV Push every 6 hours PRN Nausea and/or Vomiting  ALBUTerol    0.083% 2.5milliGRAM(s) Nebulizer every 4 hours PRN Shortness of Breath and/or Wheezing  oxyCODONE  5 mG/acetaminophen 325 mG 1Tablet(s) Oral every 6 hours PRN Mild Pain (1 - 3)  oxyCODONE  5 mG/acetaminophen 325 mG 2Tablet(s) Oral every 6 hours PRN Moderate Pain (4 - 6)  morphine  - Injectable 4milliGRAM(s) IV Push every 4 hours PRN Severe Pain (7 - 10)  sodium chloride 0.65% Nasal 2Spray(s) Both Nostrils every 1 hour PRN Nasal Congestion      Allergies    bacitracin (Rash)  sulfa drugs (Unknown)    Intolerances        Vital Signs Last 24 Hrs  T(C): 37.1, Max: 37.1 (05-26 @ 06:14)  T(F): 98.7, Max: 98.7 (05-26 @ 06:14)  HR: 102 (80 - 107)  BP: 141/66 (141/58 - 161/73)  BP(mean): --  RR: 18 (18 - 19)  SpO2: 100% (95% - 100%)    PHYSICAL EXAMINATION:    NECK:  Supple. No lymphadenopathy. Jugular venous pressure not elevated. Carotids equal.   HEART:   The cardiac impulse has a normal quality. Reg., Nl S1 and S2.  There are no murmurs, rubs or gallops noted  CHEST:  Chest rhonchi to auscultation. Normal respiratory effort.  ABDOMEN:  Soft and nontender.   EXTREMITIES:  There is no edema.       LABS:                        12.1   6.4   )-----------( 298      ( 26 May 2017 07:14 )             35.5     05-26    134<L>  |  98  |  9   ----------------------------<  127<H>  4.1   |  25  |  0.71    Ca    9.1      26 May 2017 07:14  Phos  3.2     05-26  Mg     2.0     05-26

## 2017-05-26 NOTE — DISCHARGE NOTE ADULT - CARE PROVIDER_API CALL
Nikos Ponce), ColonRectal Surgery; Surgery  [address] [facility] [address] [place] [address]  Phone: [phone]  Fax: [phone]

## 2017-05-26 NOTE — DISCHARGE NOTE ADULT - MEDICATION SUMMARY - MEDICATIONS TO TAKE
I will START or STAY ON the medications listed below when I get home from the hospital:    Aspirin Enteric Coated 81 mg oral delayed release tablet  -- 1 tab(s) by mouth once a day  -- Indication: For Home med    acetaminophen-oxycodone 325 mg-5 mg oral tablet  -- 1 tab(s) by mouth every 6 hours, As Needed -for moderate pain [id number]  -- Caution federal law prohibits the transfer of this drug to any person other  than the person for whom it was prescribed.  May cause drowsiness.  Alcohol may intensify this effect.  Use care when operating dangerous machinery.  This prescription cannot be refilled.  This product contains acetaminophen.  Do not use  with any other product containing acetaminophen to prevent possible liver damage.  Using more of this medication than prescribed may cause serious breathing problems.    -- Indication: For pain    simvastatin 10 mg oral tablet  -- 1 tab(s) by mouth once a day (at bedtime)  -- Indication: For Home med    Diovan  mg-25 mg oral tablet  -- 1 tab(s) by mouth once a day  -- Indication: For Home med    albuterol 90 mcg/inh inhalation aerosol  -- 2 puff(s) inhaled every 6 hours, As Needed -for shortness of breath and/or wheezing  -- Indication: For asthma    budesonide-formoterol 160 mcg-4.5 mcg/inh inhalation aerosol  -- 2 puff(s) inhaled 2 times a day  -- Indication: For Chronic obstructive pulmonary disease (COPD)    cefuroxime 250 mg oral tablet  -- 1 tab(s) by mouth every 12 hours  -- Indication: For Chronic obstructive pulmonary disease (COPD)    Singulair 10 mg oral tablet  -- 1 tab(s) by mouth once a day  -- Indication: For asthma I will START or STAY ON the medications listed below when I get home from the hospital:    predniSONE 20 mg oral tablet  -- 1 tab(s) by mouth 2 times a day  take on 5/26, 5/27, 5/28  -- It is very important that you take or use this exactly as directed.  Do not skip doses or discontinue unless directed by your doctor.  Obtain medical advice before taking any non-prescription drugs as some may affect the action of this medication.  Take with food or milk.    -- Indication: For Chronic obstructive pulmonary disease (COPD)    predniSONE 20 mg oral tablet  -- 1 tab(s) by mouth once a day  take on 5/29, 5/30, 5/31  -- It is very important that you take or use this exactly as directed.  Do not skip doses or discontinue unless directed by your doctor.  Obtain medical advice before taking any non-prescription drugs as some may affect the action of this medication.  Take with food or milk.    -- Indication: For Chronic obstructive pulmonary disease (COPD)    Aspirin Enteric Coated 81 mg oral delayed release tablet  -- 1 tab(s) by mouth once a day  -- Indication: For Home med    acetaminophen-oxycodone 325 mg-5 mg oral tablet  -- 1 tab(s) by mouth every 6 hours, As Needed -for moderate pain [id number]  -- Caution federal law prohibits the transfer of this drug to any person other  than the person for whom it was prescribed.  May cause drowsiness.  Alcohol may intensify this effect.  Use care when operating dangerous machinery.  This prescription cannot be refilled.  This product contains acetaminophen.  Do not use  with any other product containing acetaminophen to prevent possible liver damage.  Using more of this medication than prescribed may cause serious breathing problems.    -- Indication: For pain    simvastatin 10 mg oral tablet  -- 1 tab(s) by mouth once a day (at bedtime)  -- Indication: For Home med    Diovan  mg-25 mg oral tablet  -- 1 tab(s) by mouth once a day  -- Indication: For Home med    albuterol 90 mcg/inh inhalation aerosol  -- 2 puff(s) inhaled every 6 hours, As Needed -for shortness of breath and/or wheezing  -- Indication: For asthma    budesonide-formoterol 160 mcg-4.5 mcg/inh inhalation aerosol  -- 2 puff(s) inhaled 2 times a day  -- Indication: For Chronic obstructive pulmonary disease (COPD)    cefuroxime 250 mg oral tablet  -- 1 tab(s) by mouth every 12 hours  -- Indication: For Chronic obstructive pulmonary disease (COPD)    Singulair 10 mg oral tablet  -- 1 tab(s) by mouth once a day  -- Indication: For asthma

## 2017-05-30 DIAGNOSIS — M81.0 AGE-RELATED OSTEOPOROSIS WITHOUT CURRENT PATHOLOGICAL FRACTURE: ICD-10-CM

## 2017-05-30 DIAGNOSIS — R91.1 SOLITARY PULMONARY NODULE: ICD-10-CM

## 2017-05-30 DIAGNOSIS — D49.0 NEOPLASM OF UNSPECIFIED BEHAVIOR OF DIGESTIVE SYSTEM: ICD-10-CM

## 2017-05-30 DIAGNOSIS — E78.00 PURE HYPERCHOLESTEROLEMIA, UNSPECIFIED: ICD-10-CM

## 2017-05-30 DIAGNOSIS — Z87.01 PERSONAL HISTORY OF PNEUMONIA (RECURRENT): ICD-10-CM

## 2017-05-30 DIAGNOSIS — K21.9 GASTRO-ESOPHAGEAL REFLUX DISEASE WITHOUT ESOPHAGITIS: ICD-10-CM

## 2017-05-30 DIAGNOSIS — Z86.718 PERSONAL HISTORY OF OTHER VENOUS THROMBOSIS AND EMBOLISM: ICD-10-CM

## 2017-05-30 DIAGNOSIS — K64.8 OTHER HEMORRHOIDS: ICD-10-CM

## 2017-05-30 DIAGNOSIS — I10 ESSENTIAL (PRIMARY) HYPERTENSION: ICD-10-CM

## 2017-05-30 DIAGNOSIS — Z88.1 ALLERGY STATUS TO OTHER ANTIBIOTIC AGENTS STATUS: ICD-10-CM

## 2017-05-30 DIAGNOSIS — K44.9 DIAPHRAGMATIC HERNIA WITHOUT OBSTRUCTION OR GANGRENE: ICD-10-CM

## 2017-05-30 DIAGNOSIS — Z88.2 ALLERGY STATUS TO SULFONAMIDES: ICD-10-CM

## 2017-05-30 DIAGNOSIS — E87.1 HYPO-OSMOLALITY AND HYPONATREMIA: ICD-10-CM

## 2017-05-30 DIAGNOSIS — K56.69 OTHER INTESTINAL OBSTRUCTION: ICD-10-CM

## 2017-05-30 DIAGNOSIS — J44.1 CHRONIC OBSTRUCTIVE PULMONARY DISEASE WITH (ACUTE) EXACERBATION: ICD-10-CM

## 2017-05-30 DIAGNOSIS — Z87.891 PERSONAL HISTORY OF NICOTINE DEPENDENCE: ICD-10-CM

## 2017-05-30 DIAGNOSIS — K57.30 DIVERTICULOSIS OF LARGE INTESTINE WITHOUT PERFORATION OR ABSCESS WITHOUT BLEEDING: ICD-10-CM
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
## 2017-05-30 DIAGNOSIS — E87.8 OTHER DISORDERS OF ELECTROLYTE AND FLUID BALANCE, NOT ELSEWHERE CLASSIFIED: ICD-10-CM
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
## 2017-05-30 DIAGNOSIS — J45.40 MODERATE PERSISTENT ASTHMA, UNCOMPLICATED: ICD-10-CM

## 2017-05-30 DIAGNOSIS — K64.0 FIRST DEGREE HEMORRHOIDS: ICD-10-CM

## 2017-06-07 ENCOUNTER — APPOINTMENT (OUTPATIENT)
Dept: COLORECTAL SURGERY | Facility: CLINIC | Age: 72
End: 2017-06-07

## 2017-06-07 VITALS
TEMPERATURE: 97.9 F | DIASTOLIC BLOOD PRESSURE: 72 MMHG | SYSTOLIC BLOOD PRESSURE: 132 MMHG | HEIGHT: 61 IN | WEIGHT: 122 LBS | BODY MASS INDEX: 23.03 KG/M2 | HEART RATE: 1 BPM

## 2017-11-29 ENCOUNTER — APPOINTMENT (OUTPATIENT)
Dept: COLORECTAL SURGERY | Facility: CLINIC | Age: 72
End: 2017-11-29
Payer: COMMERCIAL

## 2017-11-29 VITALS
BODY MASS INDEX: 23.03 KG/M2 | RESPIRATION RATE: 14 BRPM | WEIGHT: 122 LBS | SYSTOLIC BLOOD PRESSURE: 132 MMHG | DIASTOLIC BLOOD PRESSURE: 79 MMHG | HEIGHT: 61 IN | HEART RATE: 82 BPM

## 2017-11-29 PROCEDURE — 99214 OFFICE O/P EST MOD 30 MIN: CPT

## 2017-12-04 ENCOUNTER — OTHER (OUTPATIENT)
Age: 72
End: 2017-12-04

## 2018-05-08 NOTE — DISCHARGE NOTE ADULT - PROVIDER TOKENS
Patient: Sukhdeep Gomez    Procedure(s):  Circumcision ,  Glans Biopsy Cystoscopy - Wound Class: II-Clean Contaminated    Diagnosis: phimosis  Diagnosis Additional Information: No value filed.    Anesthesia Type:   MAC     Note:  Airway :Room Air  Patient transferred to:Phase II  Handoff Report: Identifed the Patient, Identified the Reponsible Provider, Reviewed the pertinent medical history, Discussed the surgical course, Reviewed Intra-OP anesthesia mangement and issues during anesthesia, Set expectations for post-procedure period and Allowed opportunity for questions and acknowledgement of understanding      Vitals: (Last set prior to Anesthesia Care Transfer)    CRNA VITALS  5/8/2018 0800 - 5/8/2018 0837      5/8/2018             Resp Rate (observed): 12    Resp Rate (set): 10                Electronically Signed By: LEXUS García CRNA  May 8, 2018  8:37 AM  
TOKEN:'[id number]'

## 2018-07-16 PROBLEM — Z86.718 PERSONAL HISTORY OF OTHER VENOUS THROMBOSIS AND EMBOLISM: Chronic | Status: ACTIVE | Noted: 2017-05-19

## 2018-07-16 PROBLEM — Z87.891 PERSONAL HISTORY OF NICOTINE DEPENDENCE: Chronic | Status: ACTIVE | Noted: 2017-05-19

## 2021-12-16 PROBLEM — K64.8 OTHER HEMORRHOIDS: Chronic | Status: ACTIVE | Noted: 2017-05-19

## 2021-12-16 PROBLEM — R14.3 FLATULENCE: Chronic | Status: ACTIVE | Noted: 2017-05-19

## 2021-12-16 PROBLEM — K57.90 DIVERTICULOSIS OF INTESTINE, PART UNSPECIFIED, WITHOUT PERFORATION OR ABSCESS WITHOUT BLEEDING: Chronic | Status: ACTIVE | Noted: 2017-05-19

## 2021-12-16 PROBLEM — I10 ESSENTIAL (PRIMARY) HYPERTENSION: Chronic | Status: ACTIVE | Noted: 2017-05-19

## 2021-12-16 PROBLEM — E78.00 PURE HYPERCHOLESTEROLEMIA, UNSPECIFIED: Chronic | Status: ACTIVE | Noted: 2017-05-19

## 2021-12-16 PROBLEM — K64.0 FIRST DEGREE HEMORRHOIDS: Chronic | Status: ACTIVE | Noted: 2017-05-19

## 2021-12-16 PROBLEM — J44.9 CHRONIC OBSTRUCTIVE PULMONARY DISEASE, UNSPECIFIED: Chronic | Status: ACTIVE | Noted: 2017-05-19

## 2021-12-16 PROBLEM — J34.2 DEVIATED NASAL SEPTUM: Chronic | Status: ACTIVE | Noted: 2017-05-19

## 2021-12-16 PROBLEM — M85.80 OTHER SPECIFIED DISORDERS OF BONE DENSITY AND STRUCTURE, UNSPECIFIED SITE: Chronic | Status: ACTIVE | Noted: 2017-05-19

## 2021-12-16 PROBLEM — K21.9 GASTRO-ESOPHAGEAL REFLUX DISEASE WITHOUT ESOPHAGITIS: Chronic | Status: ACTIVE | Noted: 2017-05-19

## 2021-12-17 ENCOUNTER — NON-APPOINTMENT (OUTPATIENT)
Age: 76
End: 2021-12-17

## 2021-12-17 ENCOUNTER — APPOINTMENT (OUTPATIENT)
Dept: INTERNAL MEDICINE | Facility: CLINIC | Age: 76
End: 2021-12-17
Payer: COMMERCIAL

## 2021-12-17 VITALS
SYSTOLIC BLOOD PRESSURE: 132 MMHG | DIASTOLIC BLOOD PRESSURE: 80 MMHG | BODY MASS INDEX: 23.62 KG/M2 | TEMPERATURE: 97.2 F | OXYGEN SATURATION: 94 % | WEIGHT: 125 LBS | HEART RATE: 90 BPM

## 2021-12-17 DIAGNOSIS — Z00.00 ENCOUNTER FOR GENERAL ADULT MEDICAL EXAMINATION W/OUT ABNORMAL FINDINGS: ICD-10-CM

## 2021-12-17 PROCEDURE — 99204 OFFICE O/P NEW MOD 45 MIN: CPT

## 2021-12-17 PROCEDURE — 93000 ELECTROCARDIOGRAM COMPLETE: CPT

## 2021-12-21 LAB
24R-OH-CALCIDIOL SERPL-MCNC: 54.3 PG/ML
ALBUMIN SERPL ELPH-MCNC: 4.4 G/DL
ALP BLD-CCNC: 68 U/L
ALT SERPL-CCNC: 35 U/L
ANION GAP SERPL CALC-SCNC: 13 MMOL/L
APPEARANCE: CLEAR
AST SERPL-CCNC: 36 U/L
BILIRUB SERPL-MCNC: 0.5 MG/DL
BILIRUBIN URINE: NEGATIVE
BLOOD URINE: NEGATIVE
BUN SERPL-MCNC: 15 MG/DL
CALCIUM SERPL-MCNC: 10.1 MG/DL
CHLORIDE SERPL-SCNC: 96 MMOL/L
CHOLEST SERPL-MCNC: 239 MG/DL
CO2 SERPL-SCNC: 26 MMOL/L
COLOR: NORMAL
COVID-19 SPIKE DOMAIN ANTIBODY INTERPRETATION: POSITIVE
CREAT SERPL-MCNC: 0.87 MG/DL
GLUCOSE QUALITATIVE U: NEGATIVE
GLUCOSE SERPL-MCNC: 111 MG/DL
HDLC SERPL-MCNC: 103 MG/DL
KETONES URINE: NEGATIVE
LDLC SERPL CALC-MCNC: 118 MG/DL
LEUKOCYTE ESTERASE URINE: NEGATIVE
NITRITE URINE: NEGATIVE
NONHDLC SERPL-MCNC: 136 MG/DL
PH URINE: 7
POTASSIUM SERPL-SCNC: 4.3 MMOL/L
PROT SERPL-MCNC: 6.8 G/DL
PROTEIN URINE: NEGATIVE
SARS-COV-2 AB SERPL IA-ACNC: >250 U/ML
SODIUM SERPL-SCNC: 135 MMOL/L
SPECIFIC GRAVITY URINE: 1.02
TRIGL SERPL-MCNC: 91 MG/DL
TSH SERPL-ACNC: 0.83 UIU/ML
UROBILINOGEN URINE: NORMAL

## 2021-12-29 NOTE — HISTORY OF PRESENT ILLNESS
[Other: _____] : [unfilled] [FreeTextEntry1] : patient here to establish as a new patient and physical [de-identified] :  Pt feels better at the time of exam, no fever , shaking chills no nausea, no vomiting, no diarrhoea, no constipation, no chest pain, no palpitations.Denies any cough or any other respiratory s/s.Not loosing lot of weight nor gaining excess amount of weight.No muscle aches, no joint pains, no skin rashes.No forgetfulness no difficulty with ADLs, Able to sleep, eat ,function and eliminate well.Not been to an emergency room or hospital in the last one year. vaccinated for covid with both vaccinations.offers no complaints.\par

## 2021-12-29 NOTE — PLAN
[FreeTextEntry1] : Diet low in simple carbohydrates, rich in fiber, no trans fat and low dietary fat, if possible plant based healthy fats.\par good source of proteins, both plant and animal sources, try to avoid red meats if possible.\par sleep is very important at least 8 hours of restful sleep, if possible afternoon brief siesta, for people above 60 years of age.\par Exercise, both aerobic and weight training.\par Healthy habits like gardening, walking etc, both as exercise and hobby\par wear seat belts,\par Immunizations please pay attention to the age, season etc.\par Avoid drugs, alcohol and refrain from smoking.\par Know your risk factors ,go for screening tests.\par

## 2021-12-29 NOTE — HEALTH RISK ASSESSMENT
[Never] : Never [No] : No [0] : 2) Feeling down, depressed, or hopeless: Not at all (0) [PHQ-2 Negative - No further assessment needed] : PHQ-2 Negative - No further assessment needed [] :  [Feels Safe at Home] : Feels safe at home [Fully functional (bathing, dressing, toileting, transferring, walking, feeding)] : Fully functional (bathing, dressing, toileting, transferring, walking, feeding) [Fully functional (using the telephone, shopping, preparing meals, housekeeping, doing laundry, using] : Fully functional and needs no help or supervision to perform IADLs (using the telephone, shopping, preparing meals, housekeeping, doing laundry, using transportation, managing medications and managing finances) [[id number]] : 0 [Change in mental status noted] : No change in mental status noted [MammogramComments] : sent [PapSmearComments] : sent [BoneDensityComments] : sent [ColonoscopyComments] : sent [FreeTextEntry2] : ER

## 2022-02-18 ENCOUNTER — APPOINTMENT (OUTPATIENT)
Dept: INTERNAL MEDICINE | Facility: CLINIC | Age: 77
End: 2022-02-18
Payer: COMMERCIAL

## 2022-02-18 VITALS
SYSTOLIC BLOOD PRESSURE: 140 MMHG | BODY MASS INDEX: 23.81 KG/M2 | DIASTOLIC BLOOD PRESSURE: 78 MMHG | HEART RATE: 80 BPM | OXYGEN SATURATION: 97 % | WEIGHT: 126 LBS | TEMPERATURE: 97.6 F

## 2022-02-18 DIAGNOSIS — B34.9 VIRAL INFECTION, UNSPECIFIED: ICD-10-CM

## 2022-02-18 DIAGNOSIS — J01.90 ACUTE SINUSITIS, UNSPECIFIED: ICD-10-CM

## 2022-02-18 DIAGNOSIS — R06.2 WHEEZING: ICD-10-CM

## 2022-02-18 PROCEDURE — 99214 OFFICE O/P EST MOD 30 MIN: CPT

## 2022-02-18 NOTE — REVIEW OF SYSTEMS
[Hoarseness] : hoarseness [Nasal Discharge] : nasal discharge [Sore Throat] : sore throat [Postnasal Drip] : postnasal drip [Wheezing] : wheezing [Cough] : cough [Negative] : Heme/Lymph [FreeTextEntry4] : sinus tenderness

## 2022-02-18 NOTE — HISTORY OF PRESENT ILLNESS
[Spouse] : spouse [FreeTextEntry8] : patient here  with a cough since Tuesday 2/15/22\par pt. comes with complaints of sore throat, Also complaints of sinus congestion..\par No ear ache, but feels that the ears are congested.no discharge from the ears.No loss of smell and no loss of taste.No fever no shaking chills. c/o body aches.No nausea,vomiting,no fever.\par \par

## 2022-02-18 NOTE — PLAN
[FreeTextEntry1] : plenty of PO liquids, including fruit juices, which are rich in vitamin C supplements.\par Vitamin C supplements,\par steam inhalation times a day.\par warm salt water gargling not only helps with the healing but also helps in faster healing.\par Tylenol for fever,\par if the s/s not improving and getting worse, please contact the office.\par \par

## 2022-02-18 NOTE — PHYSICAL EXAM
[No Acute Distress] : no acute distress [Well Nourished] : well nourished [Well Developed] : well developed [Well-Appearing] : well-appearing [Normal Sclera/Conjunctiva] : normal sclera/conjunctiva [PERRL] : pupils equal round and reactive to light [EOMI] : extraocular movements intact [Normal Outer Ear/Nose] : the outer ears and nose were normal in appearance [Normal Oropharynx] : the oropharynx was normal [No JVD] : no jugular venous distention [No Lymphadenopathy] : no lymphadenopathy [Supple] : supple [Thyroid Normal, No Nodules] : the thyroid was normal and there were no nodules present [No Respiratory Distress] : no respiratory distress  [No Accessory Muscle Use] : no accessory muscle use [Clear to Auscultation] : lungs were clear to auscultation bilaterally [Normal Rate] : normal rate  [Regular Rhythm] : with a regular rhythm [Normal S1, S2] : normal S1 and S2 [No Murmur] : no murmur heard [No Carotid Bruits] : no carotid bruits [No Abdominal Bruit] : a ~M bruit was not heard ~T in the abdomen [No Varicosities] : no varicosities [Pedal Pulses Present] : the pedal pulses are present [No Edema] : there was no peripheral edema [No Palpable Aorta] : no palpable aorta [No Extremity Clubbing/Cyanosis] : no extremity clubbing/cyanosis [Soft] : abdomen soft [Non Tender] : non-tender [Non-distended] : non-distended [No Masses] : no abdominal mass palpated [No HSM] : no HSM [Normal Bowel Sounds] : normal bowel sounds [Normal Posterior Cervical Nodes] : no posterior cervical lymphadenopathy [Normal Anterior Cervical Nodes] : no anterior cervical lymphadenopathy [No CVA Tenderness] : no CVA  tenderness [No Spinal Tenderness] : no spinal tenderness [No Joint Swelling] : no joint swelling [Grossly Normal Strength/Tone] : grossly normal strength/tone [No Rash] : no rash [Coordination Grossly Intact] : coordination grossly intact [No Focal Deficits] : no focal deficits [Normal Gait] : normal gait [Deep Tendon Reflexes (DTR)] : deep tendon reflexes were 2+ and symmetric [Normal Affect] : the affect was normal [Normal Insight/Judgement] : insight and judgment were intact [de-identified] : bilateral wheezing present

## 2022-02-24 LAB — SARS-COV-2 N GENE NPH QL NAA+PROBE: NOT DETECTED

## 2022-04-27 ENCOUNTER — RX RENEWAL (OUTPATIENT)
Age: 77
End: 2022-04-27

## 2022-05-07 NOTE — CONSULT NOTE ADULT - CONSULT REASON
Problem: Respiratory Impairment - Respiratory Therapy 253  Intervention: Inhaled gas administration  Intervention Status  Done  Intervention: Respiratory support devices  Intervention Status  Done         Medical management Non-Reassuring FHR/ Section

## 2022-05-18 ENCOUNTER — RX RENEWAL (OUTPATIENT)
Age: 77
End: 2022-05-18

## 2022-06-20 NOTE — PROGRESS NOTE ADULT - NEGATIVE GENERAL SYMPTOMS
Physical Medicine & Rehabilitation  Consult Note      Admitting Physician: Vern Hansen MD    Primary Care Provider: Sabi Cheatham PA-C     Reason for Consult:  Asses rehab needs, promote physical and mental function, analyze level of care to determine rehab needs, improve ability to actively participate in the rehabilitation process, and decrease likelihood of re-admit to the hospital after discharge. History of Present Illness:    Cody Montgomery is a 35 y.o. female admitted to [facility] on 6/18/2022. Patient was admitted recently admitted through the emergency room with a change in functional status due to MS exacerbation and UTI. She also was complaining of significant spinal and hip pain. She is being followed by neurology. She is on IV antibiotics for her UTI. Neurologic Problem  The patient's primary symptoms include an altered mental status, clumsiness, focal sensory loss, focal weakness, a loss of balance and memory loss. This is a recurrent problem. The current episode started in the past 7 days. The neurological problem developed insidiously. The problem is unchanged. There was left-sided, upper extremity and lower extremity focality noted. Associated symptoms include bladder incontinence, dizziness, fatigue, light-headedness and neck pain. Pertinent negatives include no abdominal pain, auditory change, aura, back pain, bowel incontinence, chest pain, confusion, fever, headaches, nausea, palpitations or shortness of breath. Past treatments include walking. The treatment provided mild relief. Her past medical history is significant for mood changes. There is no history of a clotting disorder, a CVA, dementia, head trauma or liver disease. I reviewed recent nursing notes discussed care with acute care providers, \" Has significant lower extremity weakness left greater than right with left foot drop. Patient has swelling of the left lower extremity. Negative for DVT. Afebrile. \".   Events from the previous 24 hours reviewed  -ReSound of abdomen was unremarkable. Their inpatient work up has included:    Imaging:  Imaging and other studies reviewed and discussed with patient and staff    XR CERVICAL SPINE 6/19/2022 Vertebral bodies normal in height and alignment. No prevertebral soft tissue swelling. No fracture, dislocation, bone lesion. NEGATIVE CERVICAL SPINE      XR HIP LEFT  6/19/2022 Bilateral tubal ligation clips identified within pelvic inlet. No fracture, dislocation, bone lesion. NEGATIVE PELVIS AND LEFT HIP         XR FEMUR RIGHT  6/19/2022  No fracture, dislocation, bone lesion. NEGATIVE RIGHT FEMUR      US ABDOMEN   6/20/2022  The gallbladder is physiologically distended. The gallbladder wall is upper limits of normal.  There is echogenic debris within the gallbladder in the decubitus position, suspected gallbladder sludge. No evidence of cholelithiasis. The common bile duct measures up to  3 mm in diameter. No intrahepatic bile duct dilatation is seen. No focal liver lesions are noted. The echogenicity of liver is unremarkable. The pancreas was not visualized due to interfering bowel gas. Tail of pancreas is not well visualized. No free fluid is seen within Morisons pouch. NO SIGN OF CHOLELITHIASIS OR BILE DUCT DILATATION. SLUDGE WITHIN THE GALLBLADDER. NO DEFINITE FOCAL LIVER ABNORMALITY. US DUP LOWER EXTREMITIES BILATERAL VENOUS : 6/20/2022   NO SONOGRAPHIC EVIDENCE OF DEEP VENOUS THROMBOSIS WITHIN THE BILATERAL LOWER EXTREMITIES.               Labs:    Labs reviewed and discussed with patient and staff    No results found for: POCGLU  Lab Results   Component Value Date     06/20/2022    K 4.3 06/20/2022     06/20/2022    CO2 24 06/20/2022    BUN 20 06/20/2022    CREATININE 0.59 06/20/2022    CALCIUM 8.5 06/20/2022    LABALBU 3.2 06/19/2022    BILITOT <0.2 06/19/2022    ALKPHOS 47 06/19/2022     06/19/2022    ALT 72 06/19/2022     Lab Results   Component Value Date    WBC 11.5 06/20/2022    RBC 3.55 06/20/2022    HGB 9.7 06/20/2022    HCT 30.6 06/20/2022    MCV 86.2 06/20/2022    MCH 27.4 06/20/2022    MCHC 31.8 06/20/2022    RDW 16.3 06/20/2022     06/20/2022    MPV 10.7 06/09/2014     No results found for: VITD25  Lab Results   Component Value Date    APPEARANCE CLOUDY 01/11/2013    COLORU Yellow 06/19/2022    NITRU Negative 06/19/2022    GLUCOSEU 500 06/19/2022    KETUA 15 06/19/2022    UROBILINOGEN 1.0 06/19/2022    BILIRUBINUR Negative 06/19/2022    BILIRUBINUR neg 10/17/2011     Lab Results   Component Value Date    PROTIME 15.4 06/19/2022     Lab Results   Component Value Date    INR 1.2 06/19/2022         I discussed results with patient. Current Rehabilitation Assessments:    Rehabilitation:  Physical Therapy  Bed mobility:  Bed mobility  Supine to Sit: Stand by assistance (06/20/22 1349)  Sit to Supine: Stand by assistance (06/20/22 1349)  Scooting: Stand by assistance (06/20/22 1349)  Transfers:  Transfers  Sit to Stand: Contact guard assistance;Minimal Assistance (06/20/22 1349)  Stand to sit: Contact guard assistance (06/20/22 1349)  Comment: Patient required increased time to complete movement, needed verbal cuing on proper hand placement when transitioning from sit to stand with walker (06/20/22 1349)  Gait:   Ambulation  Comments: not assess for safety concerns (06/20/22 1352)  Stairs:     W/C mobility:         Occupational therapy:   Hand Dominance: Right  ADL  Feeding: Unable to assess(comment) (06/20/22 1346)  Grooming: Setup; Increased time to complete (06/20/22 1346)  UE Bathing: Setup; Increased time to complete (06/20/22 1346)  LE Bathing: Minimal assistance; Increased time to complete (06/20/22 1346)  UE Dressing: Setup; Increased time to complete (06/20/22 1346)  LE Dressing: Moderate assistance; Increased time to complete (06/20/22 1346)  Toileting: Unable to assess(comment) (22 1694)               Speech therapy:            Diet/Swallow:                         COGNITION  OT:    SP:              Re-evals pending      Past Medical History:        Diagnosis Date    Dislocated knee     Right knee    PTTD (posterior tibial tendon dysfunction)          PastSurgical History:        Procedure Laterality Date    CATARACT REMOVAL WITH IMPLANT Right     CATARACT REMOVAL WITH IMPLANT Left      SECTION  2013    EYE SURGERY      FOOT SURGERY Left          Allergies:  No Known Allergies     CurrentMedications:   Current Facility-Administered Medications: 0.9 % sodium chloride infusion, , IntraVENous, Continuous  cefTRIAXone (ROCEPHIN) 1000 mg IVPB in 50 mL D5W minibag, 1,000 mg, IntraVENous, Q24H  Vitamin D (CHOLECALCIFEROL) tablet 1,000 Units, 1,000 Units, Oral, Daily  multivitamin 1 tablet, 1 tablet, Oral, Daily  enoxaparin (LOVENOX) injection 40 mg, 40 mg, SubCUTAneous, Daily  methylPREDNISolone sodium (SOLU-MEDROL) 1,000 mg in sodium chloride 0.9 % 250 mL IVPB, 1,000 mg, IntraVENous, Daily      Social History:  Social History     Socioeconomic History    Marital status:      Spouse name: Not on file    Number of children: 1    Years of education: Not on file    Highest education level: Not on file   Occupational History    Not on file   Tobacco Use    Smoking status: Never Smoker    Smokeless tobacco: Never Used   Substance and Sexual Activity    Alcohol use: No    Drug use: No    Sexual activity: Not Currently   Other Topics Concern    Not on file   Social History Narrative    Lives With: her parents and her Son (5 y.o)    Type of Home: Apartment in [facility] [address]    Home Layout: One level    Home Access: Stairs to enter with rails - Number of Steps: 1    Bathroom Shower/Tub: Tub/Shower unit, Equipment: Shower chair,Hand-held shower    Home Equipment: Go Highman, rolling,Wheelchair-manual    Has the patient had two or more falls in the past no fever/no chills Negative for shortness of breath. Cardiovascular: Negative for chest pain and palpitations. Gastrointestinal: Negative for abdominal pain, bowel incontinence and nausea. Genitourinary: Positive for bladder incontinence. Musculoskeletal: Positive for neck pain. Negative for back pain. Neurological: Positive for dizziness, focal weakness, light-headedness and loss of balance. Negative for headaches. Psychiatric/Behavioral: Positive for memory loss. Negative for confusion. Physical Exam:  BP (!) 181/71   Pulse 90   Temp 97.5 °F (36.4 °C) (Oral)   Resp 18   Ht 5' 1\" (1.549 m)   Wt 141 lb (64 kg)   LMP 06/01/2022   SpO2 100%   BMI 26.64 kg/m²      Physical Exam  Constitutional:       General: She is not in acute distress. Appearance: She is well-developed. She is ill-appearing. She is not toxic-appearing or diaphoretic. HENT:      Head: Normocephalic. Not macrocephalic and not microcephalic. No raccoon eyes or Bill's sign. Mouth/Throat:      Pharynx: Oropharyngeal exudate present. Eyes:      General: No scleral icterus. Right eye: No discharge. Left eye: No discharge. Conjunctiva/sclera: Conjunctivae normal.      Pupils: Pupils are equal, round, and reactive to light. Neck:      Thyroid: No thyromegaly. Vascular: Normal carotid pulses. No carotid bruit, hepatojugular reflux or JVD. Trachea: No tracheal deviation. Pulmonary:      Effort: Pulmonary effort is normal.      Breath sounds: No stridor. Decreased breath sounds present. Musculoskeletal:      Cervical back: Normal range of motion. Tenderness present. Spinous process tenderness and muscular tenderness present. Thoracic back: Tenderness present. Decreased range of motion. Lumbar back: Tenderness present. Decreased range of motion. Skin:     General: Skin is warm and dry. Coloration: Skin is pale. Findings: Bruising present.       Comments: Old IV sites Neurological:      Sensory: Sensory deficit present. Coordination: Coordination abnormal.      Gait: Gait abnormal.   Psychiatric:         Attention and Perception: She is attentive. Mood and Affect: Mood is not anxious or depressed. Affect is not angry. Speech: Speech is delayed. Speech is not rapid and pressured. Behavior: Behavior is slowed. Behavior is not withdrawn. Thought Content: Thought content normal.         Cognition and Memory: Memory is not impaired. She exhibits impaired recent memory. She does not exhibit impaired remote memory. Judgment: Judgment is not impulsive or inappropriate. Ortho Exam  Neurologic Exam     Mental Status   Follows 1 step commands. Level of consciousness: alert  Knowledge: good. Cranial Nerves     CN III, IV, VI   Pupils are equal, round, and reactive to light.     Motor Exam   Muscle bulk: normal  Right arm tone: normal  Left arm tone: decreased  Right leg tone: normal  Left leg tone: decreased            Diagnostics:    Recent Results (from the past 24 hour(s))   High sensitivity CRP    Collection Time: 06/19/22  7:43 PM   Result Value Ref Range    CRP High Sensitivity 143.5 (H) 0.0 - 5.0 mg/L   TSH with Reflex    Collection Time: 06/19/22  7:43 PM   Result Value Ref Range    TSH 0.253 (L) 0.440 - 3.860 uIU/mL   CK    Collection Time: 06/19/22  7:43 PM   Result Value Ref Range    Total CK 4,188 (H) 0 - 170 U/L   T4, Free    Collection Time: 06/19/22  7:43 PM   Result Value Ref Range    T4 Free 1.20 0.84 - 1.68 ng/dL   Sedimentation Rate    Collection Time: 06/19/22  7:44 PM   Result Value Ref Range    Sed Rate 101 (H) 0 - 20 mm   Urinalysis with Reflex to Culture    Collection Time: 06/19/22  8:18 PM    Specimen: Urine   Result Value Ref Range    Color, UA Yellow Straw/Yellow    Clarity, UA TURBID (A) Clear    Glucose, Ur 500 (A) Negative mg/dL    Bilirubin Urine Negative Negative    Ketones, Urine 15 (A) Negative mg/dL Specific Gravity, UA 1.038 1.005 - 1.030    Blood, Urine Negative Negative    pH, UA 6.5 5.0 - 9.0    Protein, UA TRACE (A) Negative mg/dL    Urobilinogen, Urine 1.0 <2.0 E.U./dL    Nitrite, Urine Negative Negative    Leukocyte Esterase, Urine Negative Negative    Urine Reflex to Culture Not Indicated    Basic Metabolic Panel w/ Reflex to MG    Collection Time: 06/20/22  5:21 AM   Result Value Ref Range    Sodium 143 135 - 144 mEq/L    Potassium reflex Magnesium 4.3 3.4 - 4.9 mEq/L    Chloride 107 95 - 107 mEq/L    CO2 24 20 - 31 mEq/L    Anion Gap 12 9 - 15 mEq/L    Glucose 196 (H) 70 - 99 mg/dL    BUN 20 6 - 20 mg/dL    CREATININE 0.59 0.50 - 0.90 mg/dL    GFR Non-African American >60.0 >60    GFR  >60.0 >60    Calcium 8.5 8.5 - 9.9 mg/dL   CBC with Auto Differential    Collection Time: 06/20/22  5:21 AM   Result Value Ref Range    WBC 11.5 (H) 4.8 - 10.8 K/uL    RBC 3.55 (L) 4.20 - 5.40 M/uL    Hemoglobin 9.7 (L) 12.0 - 16.0 g/dL    Hematocrit 30.6 (L) 37.0 - 47.0 %    MCV 86.2 82.0 - 100.0 fL    MCH 27.4 27.0 - 31.3 pg    MCHC 31.8 (L) 33.0 - 37.0 %    RDW 16.3 (H) 11.5 - 14.5 %    Platelets 448 935 - 482 K/uL    Neutrophils % 94.2 %    Lymphocytes % 2.6 %    Monocytes % 3.2 %    Eosinophils % 0.0 %    Basophils % 0.0 %    Neutrophils Absolute 10.8 (H) 1.4 - 6.5 K/uL    Lymphocytes Absolute 0.3 (L) 1.0 - 4.8 K/uL    Monocytes Absolute 0.4 0.2 - 0.8 K/uL    Eosinophils Absolute 0.0 0.0 - 0.7 K/uL    Basophils Absolute 0.0 0.0 - 0.2 K/uL   CK    Collection Time: 06/20/22 12:25 PM   Result Value Ref Range    Total CK 2,304 (H) 0 - 170 U/L              Impression:    1. Impaired mobility and ADLs due to acute exacerbation of multiple sclerosis  2. Factors favoring recovery include:  good family support at home        Complex Active General Medical Issues that complicate care and require daily medical supervision: 1. Principal Problem:    [facility]  Active Problems: Impaired mobility and activities of daily living    Hyperglycemia    Neurogenic bladder    Acute cystitis    Foot drop, left foot  Resolved Problems:    * No resolved hospital problems. *            Recommendations:    1. Considering all of the factors above including the patient's current medical status, social status/home environment, their functional needs, and their ability to participate in a therapy program, I feel that they would best be served at:    acute intensive comprehensive inpatient rehabilitation program.  Due to the diagnoses above the patient has had significant decline in function and is now requiring acute intensive therapy to optimize function. They are expected to achieve meaningful functional gains. Due to medical complexity as above, rehabilitation physician services is reasonable and necessary including face-to-face visits at least 3 days/week with anticipated need to treat, manage and modify the rehabilitation course of treatment including interdisciplinary team conferences. They will require rehab physician care to monitor for neurogenic bowel bladder, postoperative pain, titration of opiate and high risk medications,   blood pressure and blood sugar control. It is my opinion that they will be able to tolerate and benefit from 3 hours of therapy a day. I reviewed the various options re: levels of care with the patient and family. Please see pre-admission screen note for further details. I discussed acute rehab with the patient and verify that the patient is able and willing to participate in 3 hours of therapy a day. Rehab and Acute Care Case Management has also reinforced this expectation.   This patient requires multidisciplinary rehabilitation treatment, including daily care and management from a PM&R physician, 24-hour rehabilitation nursing, Physical Therapy, Occupational Therapy, rehabilitation psychology, consideration of speech and language pathology, recreational therapy, nutritional services, and a rehabilitation . I feel that it is reasonable to plan for a discharge to home setting after acute rehab. 2. Specialized nursing care to focus on:  1. Bowel and bladder issues-Monitor for urinary retention-check PVRs, bladder scan--cath if no void. 2. Wound management re   -pressure relief protocols-side to side turns  3. IV medication administration Solu-Medrol    3. Monitor endurance and if necessary spread therapy out over a 7-day window-adding scheduled rest breaks when needed. Focus on energy conservation. Monitor heart rate and   cardiac medications effects on heart rate and blood pressure before, during and after therapy. Progress toward endurance training with pulse ox monitoring for saturation and heart rate. 4. monitoring for dysphagia-- Improve hydration and nutrition by adding Vitamin B12 shot times one, adding Protein supplements and push PO fluids. 5.    Treat for higher level cognitive deficits, focus on difficulty with sequencing and problem-solving. 6. Focus on higher-level balance and falls risk issues focusing on balance training and monitoring for orthostasis. Above recommendations are indicated to address medical complexity and need for appropriate rehab services. Will tailor individual care and rehab plan per individuals needs re  .     Focus of today's plan-   gauge response of Solu-Medrol on her functional status      Required Certification Data (potential inpatient rehabilitation facility patient's only)    Deficits:weakness, nutrition, mobility, impaired gait, high risk for falls, frequent falls, decreased endurance, deconditioning , debility, balance, ADL's, adjustment to disability, impaired mobility level increasing the risk of venous thromboembolism, and healing surgical sites, balance and righting reactions and cognitive deficits     Disability:mobility, locomotion, self care, bowel/ bladder status and cognitive    Potential barriers to progress/discharge:bowel/bladder dysfunction         It was my pleasure to evaluate Dov Randall today. Please call [phone] with questions.     Savannah Conklin, DO

## 2022-07-22 DIAGNOSIS — Z23 ENCOUNTER FOR IMMUNIZATION: ICD-10-CM

## 2022-07-27 ENCOUNTER — RX RENEWAL (OUTPATIENT)
Age: 77
End: 2022-07-27

## 2022-08-10 ENCOUNTER — RX RENEWAL (OUTPATIENT)
Age: 77
End: 2022-08-10

## 2022-08-31 ENCOUNTER — APPOINTMENT (OUTPATIENT)
Dept: INTERNAL MEDICINE | Facility: CLINIC | Age: 77
End: 2022-08-31

## 2022-08-31 VITALS
BODY MASS INDEX: 23.98 KG/M2 | SYSTOLIC BLOOD PRESSURE: 140 MMHG | OXYGEN SATURATION: 96 % | WEIGHT: 127 LBS | HEART RATE: 72 BPM | DIASTOLIC BLOOD PRESSURE: 80 MMHG | HEIGHT: 61 IN | TEMPERATURE: 97.3 F

## 2022-08-31 PROCEDURE — 99214 OFFICE O/P EST MOD 30 MIN: CPT

## 2022-08-31 NOTE — PLAN
[FreeTextEntry1] : Patient's blood pressure is still elevated, patient is not symptomatic advised patient to take her blood pressure medication every day without fail.  Advised patient to measure her blood pressure regularly as outpatient and if the blood pressure is high consistently above 150/90 advised the patient to come back to check with me as soon as possible.  Patient is also advised to consume low-sodium diet, plenty of p.o. liquids, and fruit and vegetable rich diet.  Advised to cut back on red meat, concentrated sweets and concentrated fatty meals.  Advised patient to follow portion control and start gentle exercises as her body can tolerate.\par \par \par \par

## 2022-08-31 NOTE — HISTORY OF PRESENT ILLNESS
[Other: _____] : [unfilled] [FreeTextEntry1] : medication refill [de-identified] :  Pt feels better at the time of exam, no fever , shaking chills no nausea, no vomiting, no diarrhoea, no constipation, no chest pain, no palpitations.Denies any cough or any other respiratory s/s.Not loosing lot of weight nor gaining excess amount of weight.No muscle aches, no joint pains, no skin rashes.No forgetfulness no difficulty with ADLs, Able to sleep, eat ,function and eliminate well.Not been to an emergency room or hospital in the last one year. vaccinated for covid with both vaccinations.offers no complaints.\par Patient had infection with COVID 19 virus, on Mother's Day she says she recovered well had infusion at the hospital.  No oral medications.

## 2022-10-19 ENCOUNTER — APPOINTMENT (OUTPATIENT)
Dept: INTERNAL MEDICINE | Facility: CLINIC | Age: 77
End: 2022-10-19

## 2022-10-19 VITALS
WEIGHT: 127 LBS | DIASTOLIC BLOOD PRESSURE: 80 MMHG | TEMPERATURE: 97.5 F | OXYGEN SATURATION: 92 % | SYSTOLIC BLOOD PRESSURE: 140 MMHG | BODY MASS INDEX: 23.98 KG/M2 | HEIGHT: 61 IN | HEART RATE: 87 BPM

## 2022-10-19 DIAGNOSIS — R10.32 LEFT LOWER QUADRANT PAIN: ICD-10-CM

## 2022-10-19 DIAGNOSIS — Z85.038 PERSONAL HISTORY OF OTHER MALIGNANT NEOPLASM OF LARGE INTESTINE: ICD-10-CM

## 2022-10-19 PROCEDURE — 36415 COLL VENOUS BLD VENIPUNCTURE: CPT

## 2022-10-19 PROCEDURE — 99214 OFFICE O/P EST MOD 30 MIN: CPT | Mod: 25

## 2022-10-21 DIAGNOSIS — L29.9 PRURITUS, UNSPECIFIED: ICD-10-CM

## 2022-10-21 LAB
24R-OH-CALCIDIOL SERPL-MCNC: 36.3 PG/ML
ALBUMIN SERPL ELPH-MCNC: 4.9 G/DL
ALP BLD-CCNC: 67 U/L
ALT SERPL-CCNC: 40 U/L
ANION GAP SERPL CALC-SCNC: 13 MMOL/L
APPEARANCE: CLEAR
AST SERPL-CCNC: 43 U/L
BASOPHILS # BLD AUTO: 0.06 K/UL
BASOPHILS NFR BLD AUTO: 0.8 %
BILIRUB SERPL-MCNC: 0.4 MG/DL
BILIRUBIN URINE: NEGATIVE
BLOOD URINE: NEGATIVE
BUN SERPL-MCNC: 15 MG/DL
CALCIUM SERPL-MCNC: 10.1 MG/DL
CHLORIDE SERPL-SCNC: 93 MMOL/L
CHOLEST SERPL-MCNC: 243 MG/DL
CO2 SERPL-SCNC: 27 MMOL/L
COLOR: YELLOW
CREAT SERPL-MCNC: 0.83 MG/DL
EGFR: 72 ML/MIN/1.73M2
EOSINOPHIL # BLD AUTO: 0.31 K/UL
EOSINOPHIL NFR BLD AUTO: 4.4 %
ESTIMATED AVERAGE GLUCOSE: 117 MG/DL
GLUCOSE QUALITATIVE U: NEGATIVE
GLUCOSE SERPL-MCNC: 104 MG/DL
HBA1C MFR BLD HPLC: 5.7 %
HCT VFR BLD CALC: 40.3 %
HDLC SERPL-MCNC: 105 MG/DL
HGB BLD-MCNC: 13.7 G/DL
IMM GRANULOCYTES NFR BLD AUTO: 0.3 %
KETONES URINE: NEGATIVE
LDLC SERPL CALC-MCNC: 121 MG/DL
LEUKOCYTE ESTERASE URINE: NEGATIVE
LYMPHOCYTES # BLD AUTO: 1.11 K/UL
LYMPHOCYTES NFR BLD AUTO: 15.7 %
MAN DIFF?: NORMAL
MCHC RBC-ENTMCNC: 32.3 PG
MCHC RBC-ENTMCNC: 34 GM/DL
MCV RBC AUTO: 95 FL
MONOCYTES # BLD AUTO: 0.49 K/UL
MONOCYTES NFR BLD AUTO: 6.9 %
NEUTROPHILS # BLD AUTO: 5.08 K/UL
NEUTROPHILS NFR BLD AUTO: 71.9 %
NITRITE URINE: NEGATIVE
NONHDLC SERPL-MCNC: 138 MG/DL
PH URINE: 7
PLATELET # BLD AUTO: 263 K/UL
POTASSIUM SERPL-SCNC: 4.3 MMOL/L
PROT SERPL-MCNC: 6.8 G/DL
PROTEIN URINE: NEGATIVE
RBC # BLD: 4.24 M/UL
RBC # FLD: 12.2 %
SODIUM SERPL-SCNC: 133 MMOL/L
SPECIFIC GRAVITY URINE: 1.01
TRIGL SERPL-MCNC: 84 MG/DL
TSH SERPL-ACNC: 1.21 UIU/ML
UROBILINOGEN URINE: NORMAL
WBC # FLD AUTO: 7.07 K/UL

## 2022-10-21 NOTE — PLAN
[FreeTextEntry1] : pruritis?dry skin\par Check the blood work.\par please try to identify the allergen, and try to stay away from the allergen.\par wear gloves while you are dealing with any sort of chemicals.\par Avoid osmotic and personal hair products.\par Protective gloves can be worn\par Hand wash frequently,Barrier creams and after work emoliants for a sever reaction.\par \par \par

## 2022-10-21 NOTE — HISTORY OF PRESENT ILLNESS
[FreeTextEntry8] : c/o all over itching, patient had colon cancer.  She had partial colectomy and follow-up CAT scans are negative so for any Ca.\par  Pt feels better at the time of exam, no fever , shaking chills no nausea, no vomiting, no diarrhoea, no constipation, no chest pain, no palpitations.Denies any cough or any other respiratory s/s.Not loosing lot of weight nor gaining excess amount of weight.No muscle aches, no joint pains, no skin rashes.No forgetfulness no difficulty with ADLs, Able to sleep, eat ,function and eliminate well.Not been to an emergency room or hospital in the last one year. vaccinated for covid with both vaccinations.offers no complaints.\par  [Other: _____] : [unfilled]

## 2022-11-08 ENCOUNTER — RX RENEWAL (OUTPATIENT)
Age: 77
End: 2022-11-08

## 2022-12-16 ENCOUNTER — RX RENEWAL (OUTPATIENT)
Age: 77
End: 2022-12-16

## 2023-02-01 ENCOUNTER — RX RENEWAL (OUTPATIENT)
Age: 78
End: 2023-02-01

## 2023-03-15 ENCOUNTER — RX RENEWAL (OUTPATIENT)
Age: 78
End: 2023-03-15

## 2023-05-11 ENCOUNTER — RX RENEWAL (OUTPATIENT)
Age: 78
End: 2023-05-11

## 2023-05-24 ENCOUNTER — RX RENEWAL (OUTPATIENT)
Age: 78
End: 2023-05-24

## 2023-06-08 ENCOUNTER — APPOINTMENT (OUTPATIENT)
Dept: INTERNAL MEDICINE | Facility: CLINIC | Age: 78
End: 2023-06-08
Payer: COMMERCIAL

## 2023-06-08 VITALS
DIASTOLIC BLOOD PRESSURE: 91 MMHG | WEIGHT: 125 LBS | OXYGEN SATURATION: 98 % | TEMPERATURE: 98.1 F | SYSTOLIC BLOOD PRESSURE: 177 MMHG | HEART RATE: 84 BPM | BODY MASS INDEX: 23.62 KG/M2

## 2023-06-08 DIAGNOSIS — K63.89 OTHER SPECIFIED DISEASES OF INTESTINE: ICD-10-CM

## 2023-06-08 DIAGNOSIS — K56.699 OTHER INTESTINAL OBSTRUCTION UNSPECIFIED AS TO PARTIAL VERSUS COMPLETE OBSTRUCTION: ICD-10-CM

## 2023-06-08 DIAGNOSIS — K57.32 DIVERTICULITIS OF LARGE INTESTINE W/OUT PERFORATION OR ABSCESS W/OUT BLEEDING: ICD-10-CM

## 2023-06-08 PROCEDURE — 99214 OFFICE O/P EST MOD 30 MIN: CPT | Mod: 25

## 2023-06-08 PROCEDURE — 36415 COLL VENOUS BLD VENIPUNCTURE: CPT

## 2023-06-08 NOTE — PLAN
[FreeTextEntry1] : blood work done.\par sent for 3 phase bone scan\par \par sent for the pelvic sonogram to explore the pelvic hard mass.\par \par sent for colonoscopy.

## 2023-06-21 LAB
ALBUMIN SERPL ELPH-MCNC: 4.7 G/DL
ALDOLASE SERPL-CCNC: 5 U/L
ALP BLD-CCNC: 65 U/L
ALT SERPL-CCNC: 34 U/L
ANION GAP SERPL CALC-SCNC: 12 MMOL/L
APPEARANCE: CLEAR
AST SERPL-CCNC: 37 U/L
BILIRUB SERPL-MCNC: 0.5 MG/DL
BILIRUBIN URINE: NEGATIVE
BLOOD URINE: NEGATIVE
BUN SERPL-MCNC: 11 MG/DL
CALCIUM SERPL-MCNC: 10 MG/DL
CEA SERPL-MCNC: 1.8 NG/ML
CHLORIDE SERPL-SCNC: 96 MMOL/L
CHOLEST SERPL-MCNC: 244 MG/DL
CK SERPL-CCNC: 140 U/L
CO2 SERPL-SCNC: 25 MMOL/L
COLOR: YELLOW
CREAT SERPL-MCNC: 0.79 MG/DL
EGFR: 76 ML/MIN/1.73M2
ESTIMATED AVERAGE GLUCOSE: 114 MG/DL
FERRITIN SERPL-MCNC: 122 NG/ML
FOLATE SERPL-MCNC: 15.5 NG/ML
GLUCOSE QUALITATIVE U: NEGATIVE MG/DL
GLUCOSE SERPL-MCNC: 113 MG/DL
HBA1C MFR BLD HPLC: 5.6 %
HDLC SERPL-MCNC: 111 MG/DL
IRON SATN MFR SERPL: 24 %
IRON SERPL-MCNC: 86 UG/DL
KETONES URINE: NEGATIVE MG/DL
LDH SERPL-CCNC: 198 U/L
LDLC SERPL CALC-MCNC: 119 MG/DL
LEUKOCYTE ESTERASE URINE: NEGATIVE
NITRITE URINE: NEGATIVE
NONHDLC SERPL-MCNC: 133 MG/DL
PH URINE: 7
POTASSIUM SERPL-SCNC: 4.6 MMOL/L
PROT SERPL-MCNC: 6.7 G/DL
PROTEIN URINE: NEGATIVE MG/DL
SODIUM SERPL-SCNC: 134 MMOL/L
SPECIFIC GRAVITY URINE: 1.01
TIBC SERPL-MCNC: 367 UG/DL
TRIGL SERPL-MCNC: 71 MG/DL
TSH SERPL-ACNC: 1.1 UIU/ML
UIBC SERPL-MCNC: 280 UG/DL
UROBILINOGEN URINE: 0.2 MG/DL
VIT B12 SERPL-MCNC: 560 PG/ML

## 2023-07-13 ENCOUNTER — NON-APPOINTMENT (OUTPATIENT)
Age: 78
End: 2023-07-13

## 2023-07-13 ENCOUNTER — APPOINTMENT (OUTPATIENT)
Dept: INTERNAL MEDICINE | Facility: CLINIC | Age: 78
End: 2023-07-13
Payer: COMMERCIAL

## 2023-07-13 VITALS
DIASTOLIC BLOOD PRESSURE: 86 MMHG | OXYGEN SATURATION: 96 % | BODY MASS INDEX: 23.05 KG/M2 | SYSTOLIC BLOOD PRESSURE: 162 MMHG | TEMPERATURE: 97.9 F | HEART RATE: 75 BPM | WEIGHT: 122 LBS

## 2023-07-13 DIAGNOSIS — C18.7 MALIGNANT NEOPLASM OF SIGMOID COLON: ICD-10-CM

## 2023-07-13 DIAGNOSIS — Z01.818 ENCOUNTER FOR OTHER PREPROCEDURAL EXAMINATION: ICD-10-CM

## 2023-07-13 PROCEDURE — 36415 COLL VENOUS BLD VENIPUNCTURE: CPT

## 2023-07-13 PROCEDURE — 99214 OFFICE O/P EST MOD 30 MIN: CPT | Mod: 25

## 2023-07-13 PROCEDURE — 93000 ELECTROCARDIOGRAM COMPLETE: CPT

## 2023-07-13 NOTE — HISTORY OF PRESENT ILLNESS
[Other: _____] : [unfilled] [FreeTextEntry1] : 71-year-old white female presented to get medical clearance for colonoscopy which is scheduled on Monday [de-identified] :  Pt feels better at the time of exam, no fever , shaking chills no nausea, no vomiting, no diarrhoea, no constipation, no chest pain, no palpitations.Denies any cough or any other respiratory s/s.Not loosing lot of weight nor gaining excess amount of weight.No muscle aches, no joint pains, no skin rashes.No forgetfulness no difficulty with ADLs, Able to sleep, eat ,function and eliminate well.Not been to an emergency room or hospital in the last one year. vaccinated for covid with both vaccinations.offers no complaints.\par

## 2023-07-13 NOTE — PLAN
[FreeTextEntry1] : EKG normal sinus rhythm, no ectopy–within normal limits,\par \par Blood work reviewed, normal values–within normal limits\par \par Physical examination within normal limits,\par \par Patient medically cleared for the surgery, no medical contraindications.  If any questions please feel free to contact [phone].\par

## 2023-07-14 LAB
ALBUMIN SERPL ELPH-MCNC: 4.4 G/DL
ALP BLD-CCNC: 61 U/L
ALT SERPL-CCNC: 33 U/L
ANION GAP SERPL CALC-SCNC: 11 MMOL/L
APPEARANCE: CLEAR
APTT BLD: 29 SEC
AST SERPL-CCNC: 39 U/L
BILIRUB SERPL-MCNC: 0.6 MG/DL
BILIRUBIN URINE: NEGATIVE
BLOOD URINE: NEGATIVE
BUN SERPL-MCNC: 14 MG/DL
CALCIUM SERPL-MCNC: 10 MG/DL
CHLORIDE SERPL-SCNC: 98 MMOL/L
CO2 SERPL-SCNC: 26 MMOL/L
COLOR: YELLOW
CREAT SERPL-MCNC: 0.78 MG/DL
EGFR: 77 ML/MIN/1.73M2
GLUCOSE QUALITATIVE U: NEGATIVE MG/DL
GLUCOSE SERPL-MCNC: 110 MG/DL
INR PPP: 0.97 RATIO
KETONES URINE: NEGATIVE MG/DL
LEUKOCYTE ESTERASE URINE: NEGATIVE
NITRITE URINE: NEGATIVE
PH URINE: 7
POTASSIUM SERPL-SCNC: 4.7 MMOL/L
PROT SERPL-MCNC: 6.5 G/DL
PROTEIN URINE: NEGATIVE MG/DL
PT BLD: 11.3 SEC
SODIUM SERPL-SCNC: 135 MMOL/L
SPECIFIC GRAVITY URINE: 1.01
UROBILINOGEN URINE: 0.2 MG/DL

## 2023-07-16 ENCOUNTER — TRANSCRIPTION ENCOUNTER (OUTPATIENT)
Age: 78
End: 2023-07-16

## 2023-08-07 ENCOUNTER — NON-APPOINTMENT (OUTPATIENT)
Age: 78
End: 2023-08-07

## 2023-09-13 ENCOUNTER — RX RENEWAL (OUTPATIENT)
Age: 78
End: 2023-09-13

## 2023-09-13 RX ORDER — MONTELUKAST 10 MG/1
10 TABLET, FILM COATED ORAL
Qty: 90 | Refills: 0 | Status: ACTIVE | COMMUNITY
Start: 2022-02-18 | End: 1900-01-01

## 2023-10-23 NOTE — H&P PST ADULT - HISTORY OF PRESENT ILLNESS
This is a  71y /o  Female who presents to [facility] prior to proposed procedure with Dr. Montano for laparoscopic possible sigmoid colon resection mobilization of splenic flexure, rigid sigmoid proctosigmoidoscopy, lysis of adhesion. Reports episode pf acute swelling/pain to abdomen and sent for CT scan with thickening of colon found. Reports s/p colonoscopy 3/2017 and was unable to complete secondary to blockage. Reports had a subsequent virtual CT scan of abdomen/pelvis which demonstrated a blockage from possible diverticulosis. Reports excessive flatulence. Denies nausea, vomiting, fever or chills. Evaluated by Dr. Montano and now presents for said procedure. Metronidazole Pregnancy And Lactation Text: This medication is Pregnancy Category B and considered safe during pregnancy.  It is also excreted in breast milk.

## 2024-04-12 ENCOUNTER — APPOINTMENT (OUTPATIENT)
Dept: FAMILY MEDICINE | Facility: CLINIC | Age: 79
End: 2024-04-12
Payer: COMMERCIAL

## 2024-04-12 VITALS
WEIGHT: 123 LBS | HEART RATE: 80 BPM | SYSTOLIC BLOOD PRESSURE: 160 MMHG | BODY MASS INDEX: 23.24 KG/M2 | OXYGEN SATURATION: 96 % | DIASTOLIC BLOOD PRESSURE: 83 MMHG

## 2024-04-12 DIAGNOSIS — R73.03 PREDIABETES.: ICD-10-CM

## 2024-04-12 DIAGNOSIS — E78.00 PURE HYPERCHOLESTEROLEMIA, UNSPECIFIED: ICD-10-CM

## 2024-04-12 DIAGNOSIS — R79.0 ABNORMAL LVL OF BLOOD MINERAL: ICD-10-CM

## 2024-04-12 DIAGNOSIS — R06.2 WHEEZING: ICD-10-CM

## 2024-04-12 PROCEDURE — 99214 OFFICE O/P EST MOD 30 MIN: CPT

## 2024-04-12 PROCEDURE — 36415 COLL VENOUS BLD VENIPUNCTURE: CPT

## 2024-04-12 RX ORDER — VALSARTAN AND HYDROCHLOROTHIAZIDE 320; 25 MG/1; MG/1
320-25 TABLET, FILM COATED ORAL
Qty: 90 | Refills: 2 | Status: ACTIVE | COMMUNITY
Start: 2022-02-02 | End: 1900-01-01

## 2024-04-12 RX ORDER — METHYLPREDNISOLONE 4 MG/1
4 TABLET ORAL
Qty: 1 | Refills: 0 | Status: DISCONTINUED | COMMUNITY
Start: 2022-11-17 | End: 2024-04-12

## 2024-04-12 RX ORDER — METHYLPREDNISOLONE 4 MG/1
4 TABLET ORAL
Qty: 1 | Refills: 0 | Status: DISCONTINUED | COMMUNITY
Start: 2022-02-18 | End: 2024-04-12

## 2024-04-12 RX ORDER — NEOMYCIN SULFATE 500 MG/1
500 TABLET ORAL
Qty: 6 | Refills: 0 | Status: DISCONTINUED | COMMUNITY
Start: 2017-05-20 | End: 2024-04-12

## 2024-04-12 RX ORDER — ALBUTEROL SULFATE 90 UG/1
108 (90 BASE) INHALANT RESPIRATORY (INHALATION)
Qty: 1 | Refills: 0 | Status: ACTIVE | COMMUNITY
Start: 2024-04-12 | End: 1900-01-01

## 2024-04-12 RX ORDER — DUPILUMAB 300 MG/2ML
300 INJECTION, SOLUTION SUBCUTANEOUS
Refills: 0 | Status: ACTIVE | COMMUNITY

## 2024-04-12 RX ORDER — METRONIDAZOLE 500 MG/1
500 TABLET ORAL
Qty: 6 | Refills: 0 | Status: DISCONTINUED | COMMUNITY
Start: 2017-05-20 | End: 2024-04-12

## 2024-04-12 RX ORDER — AMOXICILLIN 875 MG/1
875 TABLET, FILM COATED ORAL
Qty: 20 | Refills: 0 | Status: DISCONTINUED | COMMUNITY
Start: 2022-02-18 | End: 2024-04-12

## 2024-04-12 RX ORDER — HYDROXYZINE HYDROCHLORIDE 10 MG/1
10 TABLET ORAL 3 TIMES DAILY
Qty: 21 | Refills: 0 | Status: DISCONTINUED | COMMUNITY
Start: 2022-10-21 | End: 2024-04-12

## 2024-04-12 RX ORDER — TRIAMCINOLONE ACETONIDE 1 MG/G
0.1 CREAM TOPICAL TWICE DAILY
Qty: 1 | Refills: 0 | Status: DISCONTINUED | COMMUNITY
Start: 2022-10-21 | End: 2024-04-12

## 2024-04-12 NOTE — ASSESSMENT
[FreeTextEntry1] : offered xray and inhaler pt refused - states this is her chronic  advised to see pulm and get albuterol at least  agreed follow up labs

## 2024-04-12 NOTE — HISTORY OF PRESENT ILLNESS
[FreeTextEntry1] : PT here for a follow up and med refill [de-identified] : no chest pain, no sob, no cough, no fever, no dizziness, no abdominal pain, no n/v/d/c/melena/brbpr/hematuria/dysuria states has asthma and copd and sees pulmonology doesnt want inhalers  admit to trip and fall few weeks ago went to [facility] - states was told her sodium and mag were off no chest pain, no sob, no cough, no fever, no dizziness, no abdominal pain, no n/v/d/c/melena/brbpr/hematuria/dysuria

## 2024-04-12 NOTE — PHYSICAL EXAM
[No Acute Distress] : no acute distress [Well Nourished] : well nourished [Well Developed] : well developed [Well-Appearing] : well-appearing [Normal Voice/Communication] : normal voice/communication [Normal Sclera/Conjunctiva] : normal sclera/conjunctiva [No Respiratory Distress] : no respiratory distress  [No Accessory Muscle Use] : no accessory muscle use [Normal Rate] : normal rate  [Regular Rhythm] : with a regular rhythm [Normal S1, S2] : normal S1 and S2 [Soft] : abdomen soft [Non Tender] : non-tender [Non-distended] : non-distended [Grossly Normal Strength/Tone] : grossly normal strength/tone [No Rash] : no rash [Coordination Grossly Intact] : coordination grossly intact [No Focal Deficits] : no focal deficits [Normal Gait] : normal gait [Speech Grossly Normal] : speech grossly normal [Normal Affect] : the affect was normal [Alert and Oriented x3] : oriented to person, place, and time [Normal Mood] : the mood was normal [Normal Insight/Judgement] : insight and judgment were intact [de-identified] : scattered wheeze

## 2024-04-15 LAB
ALBUMIN SERPL ELPH-MCNC: 4.5 G/DL
ALP BLD-CCNC: 75 U/L
ALT SERPL-CCNC: 37 U/L
ANION GAP SERPL CALC-SCNC: 14 MMOL/L
APPEARANCE: CLEAR
AST SERPL-CCNC: 40 U/L
BACTERIA: NEGATIVE /HPF
BASOPHILS # BLD AUTO: 0.07 K/UL
BASOPHILS NFR BLD AUTO: 1.3 %
BILIRUB SERPL-MCNC: 0.4 MG/DL
BILIRUBIN URINE: NEGATIVE
BLOOD URINE: NEGATIVE
BUN SERPL-MCNC: 15 MG/DL
CALCIUM SERPL-MCNC: 10 MG/DL
CAST: 0 /LPF
CHLORIDE SERPL-SCNC: 97 MMOL/L
CHOLEST SERPL-MCNC: 227 MG/DL
CO2 SERPL-SCNC: 25 MMOL/L
COLOR: YELLOW
CREAT SERPL-MCNC: 0.82 MG/DL
EGFR: 72 ML/MIN/1.73M2
EOSINOPHIL # BLD AUTO: 0.22 K/UL
EOSINOPHIL NFR BLD AUTO: 4.1 %
EPITHELIAL CELLS: 0 /HPF
ESTIMATED AVERAGE GLUCOSE: 108 MG/DL
GLUCOSE QUALITATIVE U: NEGATIVE MG/DL
GLUCOSE SERPL-MCNC: 114 MG/DL
HBA1C MFR BLD HPLC: 5.4 %
HCT VFR BLD CALC: 39.5 %
HDLC SERPL-MCNC: 96 MG/DL
HGB BLD-MCNC: 13.8 G/DL
IMM GRANULOCYTES NFR BLD AUTO: 0.2 %
KETONES URINE: NEGATIVE MG/DL
LDLC SERPL CALC-MCNC: 118 MG/DL
LEUKOCYTE ESTERASE URINE: NEGATIVE
LYMPHOCYTES # BLD AUTO: 0.91 K/UL
LYMPHOCYTES NFR BLD AUTO: 17 %
MAGNESIUM SERPL-MCNC: 1.8 MG/DL
MAN DIFF?: NORMAL
MCHC RBC-ENTMCNC: 33.3 PG
MCHC RBC-ENTMCNC: 34.9 GM/DL
MCV RBC AUTO: 95.4 FL
MICROSCOPIC-UA: NORMAL
MONOCYTES # BLD AUTO: 0.65 K/UL
MONOCYTES NFR BLD AUTO: 12.1 %
NEUTROPHILS # BLD AUTO: 3.5 K/UL
NEUTROPHILS NFR BLD AUTO: 65.3 %
NITRITE URINE: NEGATIVE
NONHDLC SERPL-MCNC: 131 MG/DL
PH URINE: 6.5
PLATELET # BLD AUTO: 255 K/UL
POTASSIUM SERPL-SCNC: 4.2 MMOL/L
PROT SERPL-MCNC: 6.8 G/DL
PROTEIN URINE: NEGATIVE MG/DL
RBC # BLD: 4.14 M/UL
RBC # FLD: 12.3 %
RED BLOOD CELLS URINE: 1 /HPF
SODIUM SERPL-SCNC: 136 MMOL/L
SPECIFIC GRAVITY URINE: 1.02
TRIGL SERPL-MCNC: 72 MG/DL
TSH SERPL-ACNC: 1.28 UIU/ML
UROBILINOGEN URINE: 0.2 MG/DL
WBC # FLD AUTO: 5.36 K/UL
WHITE BLOOD CELLS URINE: 0 /HPF

## 2024-05-09 ENCOUNTER — APPOINTMENT (OUTPATIENT)
Dept: FAMILY MEDICINE | Facility: CLINIC | Age: 79
End: 2024-05-09
Payer: COMMERCIAL

## 2024-05-09 VITALS
WEIGHT: 127 LBS | OXYGEN SATURATION: 95 % | BODY MASS INDEX: 24 KG/M2 | SYSTOLIC BLOOD PRESSURE: 140 MMHG | DIASTOLIC BLOOD PRESSURE: 72 MMHG | HEART RATE: 82 BPM | TEMPERATURE: 97.7 F

## 2024-05-09 DIAGNOSIS — I10 ESSENTIAL (PRIMARY) HYPERTENSION: ICD-10-CM

## 2024-05-09 PROCEDURE — 99213 OFFICE O/P EST LOW 20 MIN: CPT

## 2024-05-09 RX ORDER — AMLODIPINE BESYLATE 5 MG/1
5 TABLET ORAL
Qty: 135 | Refills: 3 | Status: ACTIVE | COMMUNITY
Start: 2024-04-12 | End: 1900-01-01

## 2024-05-09 NOTE — PHYSICAL EXAM
[No Acute Distress] : no acute distress [Well Nourished] : well nourished [Well Developed] : well developed [Well-Appearing] : well-appearing [Normal Voice/Communication] : normal voice/communication [Normal Sclera/Conjunctiva] : normal sclera/conjunctiva [No Respiratory Distress] : no respiratory distress  [No Accessory Muscle Use] : no accessory muscle use [Normal Rate] : normal rate  [Regular Rhythm] : with a regular rhythm [Normal S1, S2] : normal S1 and S2 [Soft] : abdomen soft [Non Tender] : non-tender [Non-distended] : non-distended [Grossly Normal Strength/Tone] : grossly normal strength/tone [No Rash] : no rash [Coordination Grossly Intact] : coordination grossly intact [No Focal Deficits] : no focal deficits [Normal Gait] : normal gait [Speech Grossly Normal] : speech grossly normal [Normal Affect] : the affect was normal [Alert and Oriented x3] : oriented to person, place, and time [Normal Mood] : the mood was normal [Normal Insight/Judgement] : insight and judgment were intact [de-identified] : scattered wheeze

## 2024-05-09 NOTE — HISTORY OF PRESENT ILLNESS
[FreeTextEntry1] : pt here for b/p f/u [de-identified] : here for bp check  no chest pain, no sob, no cough, no fever, no dizziness, no abdominal pain, no n/v/d/c/melena/brbpr/hematuria/dysuria states getting over sinus infection - starting to feel better - states completed prednisone and zpack

## 2024-08-08 ENCOUNTER — APPOINTMENT (OUTPATIENT)
Dept: FAMILY MEDICINE | Facility: CLINIC | Age: 79
End: 2024-08-08

## 2024-08-14 ENCOUNTER — APPOINTMENT (OUTPATIENT)
Dept: INTERNAL MEDICINE | Facility: CLINIC | Age: 79
End: 2024-08-14
Payer: COMMERCIAL

## 2024-08-14 VITALS
HEART RATE: 82 BPM | TEMPERATURE: 97.2 F | DIASTOLIC BLOOD PRESSURE: 60 MMHG | BODY MASS INDEX: 22.3 KG/M2 | WEIGHT: 118 LBS | SYSTOLIC BLOOD PRESSURE: 100 MMHG | OXYGEN SATURATION: 96 %

## 2024-08-14 DIAGNOSIS — I10 ESSENTIAL (PRIMARY) HYPERTENSION: ICD-10-CM

## 2024-08-14 DIAGNOSIS — Z12.39 ENCOUNTER FOR OTHER SCREENING FOR MALIGNANT NEOPLASM OF BREAST: ICD-10-CM

## 2024-08-14 DIAGNOSIS — R73.03 PREDIABETES.: ICD-10-CM

## 2024-08-14 DIAGNOSIS — Z13.820 ENCOUNTER FOR SCREENING FOR OSTEOPOROSIS: ICD-10-CM

## 2024-08-14 DIAGNOSIS — E78.00 PURE HYPERCHOLESTEROLEMIA, UNSPECIFIED: ICD-10-CM

## 2024-08-14 PROCEDURE — 99214 OFFICE O/P EST MOD 30 MIN: CPT

## 2024-08-14 NOTE — PHYSICAL EXAM
[No Acute Distress] : no acute distress [Well Nourished] : well nourished [Well Developed] : well developed [Well-Appearing] : well-appearing [Normal Voice/Communication] : normal voice/communication [Normal Sclera/Conjunctiva] : normal sclera/conjunctiva [No Respiratory Distress] : no respiratory distress  [No Accessory Muscle Use] : no accessory muscle use [Clear to Auscultation] : lungs were clear to auscultation bilaterally [Normal Rate] : normal rate  [Regular Rhythm] : with a regular rhythm [Normal S1, S2] : normal S1 and S2 [Soft] : abdomen soft [Non Tender] : non-tender [Non-distended] : non-distended [Grossly Normal Strength/Tone] : grossly normal strength/tone [No Rash] : no rash [Coordination Grossly Intact] : coordination grossly intact [No Focal Deficits] : no focal deficits [Normal Gait] : normal gait [Speech Grossly Normal] : speech grossly normal [Normal Affect] : the affect was normal [Alert and Oriented x3] : oriented to person, place, and time [Normal Mood] : the mood was normal [Normal Insight/Judgement] : insight and judgment were intact

## 2024-08-14 NOTE — HISTORY OF PRESENT ILLNESS
[FreeTextEntry1] : Pt here for a follow up and med refill. [de-identified] : no chest pain, no sob, no cough, no fever, no dizziness, no abdominal pain, no n/v/d/c/melena/brbpr/hematuria/dysuria

## 2024-09-27 NOTE — PROGRESS NOTE ADULT - GENERAL
- s/p aspiration in the ED with 30314 WBC, 89% polys  - orthopedics consulted- s/p washout of L knee 09/25 with Dr. Lamar   - Continue empiric IV Vanc ; vanc dosing and monitoring per pharmacy  - aspirate  cultures  NGTD   - ID consulted and following   - pain control with PO Roxicodone and IV mOrphine as needed  - continue PT/OT        details…

## 2024-10-22 NOTE — PROGRESS NOTE ADULT - PSYCHIATRIC
negative Affect and characteristics of appearance, verbalizations, behaviors are appropriate

## 2024-11-08 ENCOUNTER — APPOINTMENT (OUTPATIENT)
Dept: INTERNAL MEDICINE | Facility: CLINIC | Age: 79
End: 2024-11-08
Payer: COMMERCIAL

## 2024-11-08 ENCOUNTER — NON-APPOINTMENT (OUTPATIENT)
Age: 79
End: 2024-11-08

## 2024-11-08 VITALS
TEMPERATURE: 97.3 F | BODY MASS INDEX: 23.43 KG/M2 | DIASTOLIC BLOOD PRESSURE: 68 MMHG | HEART RATE: 84 BPM | WEIGHT: 124 LBS | OXYGEN SATURATION: 96 % | SYSTOLIC BLOOD PRESSURE: 110 MMHG

## 2024-11-08 DIAGNOSIS — Z01.818 ENCOUNTER FOR OTHER PREPROCEDURAL EXAMINATION: ICD-10-CM

## 2024-11-08 DIAGNOSIS — I10 ESSENTIAL (PRIMARY) HYPERTENSION: ICD-10-CM

## 2024-11-08 DIAGNOSIS — R73.03 PREDIABETES.: ICD-10-CM

## 2024-11-08 DIAGNOSIS — E78.00 PURE HYPERCHOLESTEROLEMIA, UNSPECIFIED: ICD-10-CM

## 2024-11-08 DIAGNOSIS — Z85.038 PERSONAL HISTORY OF OTHER MALIGNANT NEOPLASM OF LARGE INTESTINE: ICD-10-CM

## 2024-11-08 PROCEDURE — 99214 OFFICE O/P EST MOD 30 MIN: CPT

## 2024-11-11 LAB
ALBUMIN SERPL ELPH-MCNC: 4.5 G/DL
ALP BLD-CCNC: 80 U/L
ALT SERPL-CCNC: 28 U/L
ANION GAP SERPL CALC-SCNC: 17 MMOL/L
APPEARANCE: CLEAR
AST SERPL-CCNC: 33 U/L
BACTERIA: NEGATIVE /HPF
BASOPHILS # BLD AUTO: 0.06 K/UL
BASOPHILS NFR BLD AUTO: 1 %
BILIRUB SERPL-MCNC: 0.4 MG/DL
BILIRUBIN URINE: NEGATIVE
BLOOD URINE: NEGATIVE
BUN SERPL-MCNC: 16 MG/DL
CALCIUM SERPL-MCNC: 10 MG/DL
CAST: 0 /LPF
CHLORIDE SERPL-SCNC: 95 MMOL/L
CHOLEST SERPL-MCNC: 235 MG/DL
CO2 SERPL-SCNC: 24 MMOL/L
COLOR: YELLOW
CREAT SERPL-MCNC: 0.89 MG/DL
EGFR: 65 ML/MIN/1.73M2
EOSINOPHIL # BLD AUTO: 0.19 K/UL
EOSINOPHIL NFR BLD AUTO: 3.1 %
EPITHELIAL CELLS: 0 /HPF
ESTIMATED AVERAGE GLUCOSE: 114 MG/DL
GLUCOSE QUALITATIVE U: NEGATIVE MG/DL
GLUCOSE SERPL-MCNC: 106 MG/DL
HBA1C MFR BLD HPLC: 5.6 %
HCT VFR BLD CALC: 36.9 %
HDLC SERPL-MCNC: 110 MG/DL
HGB BLD-MCNC: 12.9 G/DL
IMM GRANULOCYTES NFR BLD AUTO: 0.2 %
KETONES URINE: NEGATIVE MG/DL
LDLC SERPL CALC-MCNC: 112 MG/DL
LEUKOCYTE ESTERASE URINE: ABNORMAL
LYMPHOCYTES # BLD AUTO: 1 K/UL
LYMPHOCYTES NFR BLD AUTO: 16.3 %
MAN DIFF?: NORMAL
MCHC RBC-ENTMCNC: 32.7 PG
MCHC RBC-ENTMCNC: 35 G/DL
MCV RBC AUTO: 93.7 FL
MICROSCOPIC-UA: NORMAL
MONOCYTES # BLD AUTO: 0.58 K/UL
MONOCYTES NFR BLD AUTO: 9.4 %
NEUTROPHILS # BLD AUTO: 4.31 K/UL
NEUTROPHILS NFR BLD AUTO: 70 %
NITRITE URINE: NEGATIVE
NONHDLC SERPL-MCNC: 125 MG/DL
PH URINE: 7
PLATELET # BLD AUTO: 255 K/UL
POTASSIUM SERPL-SCNC: 4.7 MMOL/L
PROT SERPL-MCNC: 6.7 G/DL
PROTEIN URINE: NEGATIVE MG/DL
RBC # BLD: 3.94 M/UL
RBC # FLD: 12.3 %
RED BLOOD CELLS URINE: 0 /HPF
SODIUM SERPL-SCNC: 135 MMOL/L
SPECIFIC GRAVITY URINE: 1.01
TRIGL SERPL-MCNC: 79 MG/DL
UROBILINOGEN URINE: 0.2 MG/DL
WBC # FLD AUTO: 6.15 K/UL
WHITE BLOOD CELLS URINE: 1 /HPF

## 2024-11-13 ENCOUNTER — APPOINTMENT (OUTPATIENT)
Dept: INTERNAL MEDICINE | Facility: CLINIC | Age: 79
End: 2024-11-13

## 2025-05-16 ENCOUNTER — RX RENEWAL (OUTPATIENT)
Age: 80
End: 2025-05-16

## 2025-08-25 ENCOUNTER — RX RENEWAL (OUTPATIENT)
Age: 80
End: 2025-08-25